# Patient Record
Sex: MALE | Race: WHITE | NOT HISPANIC OR LATINO | Employment: UNEMPLOYED | URBAN - NONMETROPOLITAN AREA
[De-identification: names, ages, dates, MRNs, and addresses within clinical notes are randomized per-mention and may not be internally consistent; named-entity substitution may affect disease eponyms.]

---

## 2022-05-07 ENCOUNTER — HOSPITAL ENCOUNTER (EMERGENCY)
Facility: HOSPITAL | Age: 37
Discharge: LEFT BEFORE DISCHARGE | End: 2022-05-08
Admitting: EMERGENCY MEDICINE

## 2022-05-07 VITALS
OXYGEN SATURATION: 99 % | HEIGHT: 75 IN | HEART RATE: 111 BPM | RESPIRATION RATE: 20 BRPM | WEIGHT: 250 LBS | SYSTOLIC BLOOD PRESSURE: 136 MMHG | DIASTOLIC BLOOD PRESSURE: 101 MMHG | BODY MASS INDEX: 31.08 KG/M2 | TEMPERATURE: 98.2 F

## 2022-05-07 PROCEDURE — 99211 OFF/OP EST MAY X REQ PHY/QHP: CPT

## 2022-05-07 PROCEDURE — 99202 OFFICE O/P NEW SF 15 MIN: CPT

## 2022-05-08 LAB
ALBUMIN SERPL-MCNC: 2.99 G/DL (ref 3.5–5.2)
ALBUMIN/GLOB SERPL: 0.7 G/DL
ALP SERPL-CCNC: 87 U/L (ref 39–117)
ALT SERPL W P-5'-P-CCNC: 46 U/L (ref 1–41)
ANION GAP SERPL CALCULATED.3IONS-SCNC: 12.3 MMOL/L (ref 5–15)
AST SERPL-CCNC: 92 U/L (ref 1–40)
BILIRUB SERPL-MCNC: 5.2 MG/DL (ref 0–1.2)
BUN SERPL-MCNC: 5 MG/DL (ref 6–20)
BUN/CREAT SERPL: 9.1 (ref 7–25)
CALCIUM SPEC-SCNC: 8.3 MG/DL (ref 8.6–10.5)
CHLORIDE SERPL-SCNC: 103 MMOL/L (ref 98–107)
CO2 SERPL-SCNC: 23.7 MMOL/L (ref 22–29)
CREAT SERPL-MCNC: 0.55 MG/DL (ref 0.76–1.27)
EGFRCR SERPLBLD CKD-EPI 2021: 132.5 ML/MIN/1.73
ETHANOL BLD-MCNC: 24 MG/DL (ref 0–10)
ETHANOL UR QL: 0.02 %
GLOBULIN UR ELPH-MCNC: 4.2 GM/DL
GLUCOSE SERPL-MCNC: 136 MG/DL (ref 65–99)
HOLD SPECIMEN: NORMAL
HOLD SPECIMEN: NORMAL
INR PPP: 1.78 (ref 0.9–1.1)
LIPASE SERPL-CCNC: 54 U/L (ref 13–60)
MAGNESIUM SERPL-MCNC: 1.1 MG/DL (ref 1.6–2.6)
POTASSIUM SERPL-SCNC: 3.6 MMOL/L (ref 3.5–5.2)
PROT SERPL-MCNC: 7.2 G/DL (ref 6–8.5)
PROTHROMBIN TIME: 21.2 SECONDS (ref 12.1–14.7)
QT INTERVAL: 394 MS
QTC INTERVAL: 510 MS
SODIUM SERPL-SCNC: 139 MMOL/L (ref 136–145)
TROPONIN T SERPL-MCNC: <0.01 NG/ML (ref 0–0.03)
WHOLE BLOOD HOLD SPECIMEN: NORMAL
WHOLE BLOOD HOLD SPECIMEN: NORMAL

## 2022-05-08 PROCEDURE — 80053 COMPREHEN METABOLIC PANEL: CPT | Performed by: FAMILY MEDICINE

## 2022-05-08 PROCEDURE — 82077 ASSAY SPEC XCP UR&BREATH IA: CPT | Performed by: FAMILY MEDICINE

## 2022-05-08 PROCEDURE — 83735 ASSAY OF MAGNESIUM: CPT | Performed by: FAMILY MEDICINE

## 2022-05-08 PROCEDURE — 85610 PROTHROMBIN TIME: CPT | Performed by: FAMILY MEDICINE

## 2022-05-08 PROCEDURE — 93005 ELECTROCARDIOGRAM TRACING: CPT | Performed by: FAMILY MEDICINE

## 2022-05-08 PROCEDURE — 84484 ASSAY OF TROPONIN QUANT: CPT | Performed by: FAMILY MEDICINE

## 2022-05-08 PROCEDURE — 83690 ASSAY OF LIPASE: CPT | Performed by: FAMILY MEDICINE

## 2022-05-08 PROCEDURE — 93010 ELECTROCARDIOGRAM REPORT: CPT | Performed by: INTERNAL MEDICINE

## 2022-05-08 RX ORDER — SODIUM CHLORIDE 0.9 % (FLUSH) 0.9 %
10 SYRINGE (ML) INJECTION AS NEEDED
Status: DISCONTINUED | OUTPATIENT
Start: 2022-05-08 | End: 2022-05-08 | Stop reason: HOSPADM

## 2022-05-08 NOTE — ED NOTES
Called for reassessment with no answer. Pt not visualized in ED lobby or surrounding areas. Charge RN made aware.

## 2022-05-12 ENCOUNTER — HOSPITAL ENCOUNTER (INPATIENT)
Facility: HOSPITAL | Age: 37
LOS: 1 days | Discharge: HOME | End: 2022-05-13
Attending: PSYCHIATRY & NEUROLOGY | Admitting: PSYCHIATRY & NEUROLOGY

## 2022-05-12 ENCOUNTER — APPOINTMENT (OUTPATIENT)
Dept: ULTRASOUND IMAGING | Facility: HOSPITAL | Age: 37
End: 2022-05-12

## 2022-05-12 ENCOUNTER — APPOINTMENT (OUTPATIENT)
Dept: GENERAL RADIOLOGY | Facility: HOSPITAL | Age: 37
End: 2022-05-12

## 2022-05-12 ENCOUNTER — HOSPITAL ENCOUNTER (EMERGENCY)
Facility: HOSPITAL | Age: 37
Discharge: PSYCHIATRIC OR SUBSTANCE USE FACILITY | End: 2022-05-12
Attending: EMERGENCY MEDICINE | Admitting: EMERGENCY MEDICINE

## 2022-05-12 VITALS
SYSTOLIC BLOOD PRESSURE: 125 MMHG | TEMPERATURE: 97.8 F | HEIGHT: 76 IN | DIASTOLIC BLOOD PRESSURE: 72 MMHG | RESPIRATION RATE: 18 BRPM | BODY MASS INDEX: 27.13 KG/M2 | HEART RATE: 62 BPM | WEIGHT: 222.8 LBS | OXYGEN SATURATION: 99 %

## 2022-05-12 DIAGNOSIS — K74.60 CHRONIC LIVER DISEASE AND CIRRHOSIS: ICD-10-CM

## 2022-05-12 DIAGNOSIS — R45.851 SUICIDAL IDEATIONS: Primary | ICD-10-CM

## 2022-05-12 DIAGNOSIS — F29 PSYCHOSIS, UNSPECIFIED PSYCHOSIS TYPE: ICD-10-CM

## 2022-05-12 DIAGNOSIS — K76.9 CHRONIC LIVER DISEASE AND CIRRHOSIS: ICD-10-CM

## 2022-05-12 PROBLEM — F32.9 MDD (MAJOR DEPRESSIVE DISORDER): Status: ACTIVE | Noted: 2022-05-12

## 2022-05-12 LAB
ACANTHOCYTES BLD QL SMEAR: NORMAL
ALBUMIN SERPL-MCNC: 3.65 G/DL (ref 3.5–5.2)
ALBUMIN/GLOB SERPL: 0.8 G/DL
ALP SERPL-CCNC: 116 U/L (ref 39–117)
ALT SERPL W P-5'-P-CCNC: 52 U/L (ref 1–41)
AMMONIA BLD-SCNC: 32 UMOL/L (ref 16–60)
AMPHET+METHAMPHET UR QL: NEGATIVE
AMPHETAMINES UR QL: NEGATIVE
ANION GAP SERPL CALCULATED.3IONS-SCNC: 14.4 MMOL/L (ref 5–15)
APAP SERPL-MCNC: <5 MCG/ML (ref 0–30)
APTT PPP: 31.9 SECONDS (ref 26.5–34.5)
AST SERPL-CCNC: 106 U/L (ref 1–40)
BACTERIA UR QL AUTO: ABNORMAL /HPF
BARBITURATES UR QL SCN: NEGATIVE
BASOPHILS # BLD AUTO: 0.01 10*3/MM3 (ref 0–0.2)
BASOPHILS NFR BLD AUTO: 0.6 % (ref 0–1.5)
BENZODIAZ UR QL SCN: POSITIVE
BILIRUB CONJ SERPL-MCNC: 2.3 MG/DL (ref 0–0.3)
BILIRUB SERPL-MCNC: 9.5 MG/DL (ref 0–1.2)
BILIRUB UR QL STRIP: ABNORMAL
BUN SERPL-MCNC: 6 MG/DL (ref 6–20)
BUN/CREAT SERPL: 8.1 (ref 7–25)
BUPRENORPHINE SERPL-MCNC: NEGATIVE NG/ML
BURR CELLS BLD QL SMEAR: NORMAL
CALCIUM SPEC-SCNC: 9 MG/DL (ref 8.6–10.5)
CANNABINOIDS SERPL QL: NEGATIVE
CHLORIDE SERPL-SCNC: 103 MMOL/L (ref 98–107)
CLARITY UR: CLEAR
CO2 SERPL-SCNC: 22.6 MMOL/L (ref 22–29)
COCAINE UR QL: NEGATIVE
COLOR UR: ABNORMAL
CREAT SERPL-MCNC: 0.74 MG/DL (ref 0.76–1.27)
CRP SERPL-MCNC: 0.65 MG/DL (ref 0–0.5)
D-LACTATE SERPL-SCNC: 1.3 MMOL/L (ref 0.5–2)
D-LACTATE SERPL-SCNC: 2.1 MMOL/L (ref 0.5–2)
DEPRECATED RDW RBC AUTO: 63.1 FL (ref 37–54)
EGFRCR SERPLBLD CKD-EPI 2021: 121.2 ML/MIN/1.73
EOSINOPHIL # BLD AUTO: 0.07 10*3/MM3 (ref 0–0.4)
EOSINOPHIL NFR BLD AUTO: 4.1 % (ref 0.3–6.2)
ERYTHROCYTE [DISTWIDTH] IN BLOOD BY AUTOMATED COUNT: 17.3 % (ref 12.3–15.4)
ETHANOL BLD-MCNC: <10 MG/DL (ref 0–10)
ETHANOL UR QL: <0.01 %
FLUAV RNA RESP QL NAA+PROBE: NOT DETECTED
FLUBV RNA RESP QL NAA+PROBE: NOT DETECTED
GLOBULIN UR ELPH-MCNC: 4.9 GM/DL
GLUCOSE SERPL-MCNC: 101 MG/DL (ref 65–99)
GLUCOSE UR STRIP-MCNC: NEGATIVE MG/DL
HAV IGM SERPL QL IA: NORMAL
HAV IGM SERPL QL IA: NORMAL
HBV CORE IGM SERPL QL IA: NORMAL
HBV CORE IGM SERPL QL IA: NORMAL
HBV SURFACE AG SERPL QL IA: NORMAL
HBV SURFACE AG SERPL QL IA: NORMAL
HCT VFR BLD AUTO: 36.6 % (ref 37.5–51)
HCV AB SER DONR QL: NORMAL
HCV AB SER DONR QL: NORMAL
HGB BLD-MCNC: 12 G/DL (ref 13–17.7)
HGB UR QL STRIP.AUTO: NEGATIVE
HIV1+2 AB SER QL: NORMAL
HYALINE CASTS UR QL AUTO: ABNORMAL /LPF
HYPOCHROMIA BLD QL: NORMAL
IMM GRANULOCYTES # BLD AUTO: 0.03 10*3/MM3 (ref 0–0.05)
IMM GRANULOCYTES NFR BLD AUTO: 1.7 % (ref 0–0.5)
INR PPP: 1.77 (ref 0.9–1.1)
KETONES UR QL STRIP: NEGATIVE
LARGE PLATELETS: NORMAL
LEUKOCYTE ESTERASE UR QL STRIP.AUTO: ABNORMAL
LYMPHOCYTES # BLD AUTO: 0.27 10*3/MM3 (ref 0.7–3.1)
LYMPHOCYTES NFR BLD AUTO: 15.7 % (ref 19.6–45.3)
MAGNESIUM SERPL-MCNC: 1.2 MG/DL (ref 1.6–2.6)
MCH RBC QN AUTO: 32.3 PG (ref 26.6–33)
MCHC RBC AUTO-ENTMCNC: 32.8 G/DL (ref 31.5–35.7)
MCV RBC AUTO: 98.7 FL (ref 79–97)
METHADONE UR QL SCN: NEGATIVE
MONOCYTES # BLD AUTO: 0.13 10*3/MM3 (ref 0.1–0.9)
MONOCYTES NFR BLD AUTO: 7.6 % (ref 5–12)
NEUTROPHILS NFR BLD AUTO: 1.21 10*3/MM3 (ref 1.7–7)
NEUTROPHILS NFR BLD AUTO: 70.3 % (ref 42.7–76)
NITRITE UR QL STRIP: POSITIVE
NRBC BLD AUTO-RTO: 0 /100 WBC (ref 0–0.2)
OPIATES UR QL: NEGATIVE
OXYCODONE UR QL SCN: NEGATIVE
PCP UR QL SCN: NEGATIVE
PH UR STRIP.AUTO: 6.5 [PH] (ref 5–8)
PLATELET # BLD AUTO: 46 10*3/MM3 (ref 140–450)
PMV BLD AUTO: 10.6 FL (ref 6–12)
POIKILOCYTOSIS BLD QL SMEAR: NORMAL
POTASSIUM SERPL-SCNC: 3.1 MMOL/L (ref 3.5–5.2)
PROPOXYPH UR QL: NEGATIVE
PROT SERPL-MCNC: 8.5 G/DL (ref 6–8.5)
PROT UR QL STRIP: NEGATIVE
PROTHROMBIN TIME: 21.1 SECONDS (ref 12.1–14.7)
QT INTERVAL: 512 MS
QTC INTERVAL: 476 MS
RBC # BLD AUTO: 3.71 10*6/MM3 (ref 4.14–5.8)
RBC # UR STRIP: ABNORMAL /HPF
REF LAB TEST METHOD: ABNORMAL
SALICYLATES SERPL-MCNC: <0.3 MG/DL
SARS-COV-2 RNA RESP QL NAA+PROBE: NOT DETECTED
SCHISTOCYTES BLD QL SMEAR: NORMAL
SODIUM SERPL-SCNC: 140 MMOL/L (ref 136–145)
SP GR UR STRIP: 1.01 (ref 1–1.03)
SQUAMOUS #/AREA URNS HPF: ABNORMAL /HPF
TRICYCLICS UR QL SCN: NEGATIVE
UROBILINOGEN UR QL STRIP: ABNORMAL
WBC # UR STRIP: ABNORMAL /HPF
WBC NRBC COR # BLD: 1.72 10*3/MM3 (ref 3.4–10.8)

## 2022-05-12 PROCEDURE — 93005 ELECTROCARDIOGRAM TRACING: CPT | Performed by: PSYCHIATRY & NEUROLOGY

## 2022-05-12 PROCEDURE — 96366 THER/PROPH/DIAG IV INF ADDON: CPT

## 2022-05-12 PROCEDURE — 85730 THROMBOPLASTIN TIME PARTIAL: CPT | Performed by: PHYSICIAN ASSISTANT

## 2022-05-12 PROCEDURE — 96365 THER/PROPH/DIAG IV INF INIT: CPT

## 2022-05-12 PROCEDURE — 80179 DRUG ASSAY SALICYLATE: CPT | Performed by: PHYSICIAN ASSISTANT

## 2022-05-12 PROCEDURE — 82140 ASSAY OF AMMONIA: CPT | Performed by: PHYSICIAN ASSISTANT

## 2022-05-12 PROCEDURE — 83605 ASSAY OF LACTIC ACID: CPT | Performed by: PHYSICIAN ASSISTANT

## 2022-05-12 PROCEDURE — 25010000002 THIAMINE PER 100 MG: Performed by: PHYSICIAN ASSISTANT

## 2022-05-12 PROCEDURE — 96367 TX/PROPH/DG ADDL SEQ IV INF: CPT

## 2022-05-12 PROCEDURE — 99221 1ST HOSP IP/OBS SF/LOW 40: CPT | Performed by: INTERNAL MEDICINE

## 2022-05-12 PROCEDURE — G0432 EIA HIV-1/HIV-2 SCREEN: HCPCS | Performed by: PHYSICIAN ASSISTANT

## 2022-05-12 PROCEDURE — 80306 DRUG TEST PRSMV INSTRMNT: CPT | Performed by: PHYSICIAN ASSISTANT

## 2022-05-12 PROCEDURE — 83735 ASSAY OF MAGNESIUM: CPT | Performed by: PHYSICIAN ASSISTANT

## 2022-05-12 PROCEDURE — 76705 ECHO EXAM OF ABDOMEN: CPT

## 2022-05-12 PROCEDURE — 36415 COLL VENOUS BLD VENIPUNCTURE: CPT

## 2022-05-12 PROCEDURE — 85610 PROTHROMBIN TIME: CPT | Performed by: PHYSICIAN ASSISTANT

## 2022-05-12 PROCEDURE — 80074 ACUTE HEPATITIS PANEL: CPT | Performed by: PHYSICIAN ASSISTANT

## 2022-05-12 PROCEDURE — 25010000002 ZIPRASIDONE MESYLATE PER 10 MG: Performed by: EMERGENCY MEDICINE

## 2022-05-12 PROCEDURE — 85025 COMPLETE CBC W/AUTO DIFF WBC: CPT | Performed by: PHYSICIAN ASSISTANT

## 2022-05-12 PROCEDURE — 87636 SARSCOV2 & INF A&B AMP PRB: CPT | Performed by: PHYSICIAN ASSISTANT

## 2022-05-12 PROCEDURE — 99285 EMERGENCY DEPT VISIT HI MDM: CPT

## 2022-05-12 PROCEDURE — 81001 URINALYSIS AUTO W/SCOPE: CPT | Performed by: PHYSICIAN ASSISTANT

## 2022-05-12 PROCEDURE — 80074 ACUTE HEPATITIS PANEL: CPT

## 2022-05-12 PROCEDURE — 25010000002 MAGNESIUM SULFATE 2 GM/50ML SOLUTION: Performed by: PHYSICIAN ASSISTANT

## 2022-05-12 PROCEDURE — 80053 COMPREHEN METABOLIC PANEL: CPT | Performed by: PHYSICIAN ASSISTANT

## 2022-05-12 PROCEDURE — 74018 RADEX ABDOMEN 1 VIEW: CPT

## 2022-05-12 PROCEDURE — 96372 THER/PROPH/DIAG INJ SC/IM: CPT

## 2022-05-12 PROCEDURE — 87040 BLOOD CULTURE FOR BACTERIA: CPT | Performed by: PHYSICIAN ASSISTANT

## 2022-05-12 PROCEDURE — 76705 ECHO EXAM OF ABDOMEN: CPT | Performed by: RADIOLOGY

## 2022-05-12 PROCEDURE — 82077 ASSAY SPEC XCP UR&BREATH IA: CPT | Performed by: PHYSICIAN ASSISTANT

## 2022-05-12 PROCEDURE — 82248 BILIRUBIN DIRECT: CPT | Performed by: INTERNAL MEDICINE

## 2022-05-12 PROCEDURE — 80143 DRUG ASSAY ACETAMINOPHEN: CPT | Performed by: PHYSICIAN ASSISTANT

## 2022-05-12 PROCEDURE — 86140 C-REACTIVE PROTEIN: CPT | Performed by: PHYSICIAN ASSISTANT

## 2022-05-12 PROCEDURE — C9803 HOPD COVID-19 SPEC COLLECT: HCPCS | Performed by: PHYSICIAN ASSISTANT

## 2022-05-12 PROCEDURE — 85007 BL SMEAR W/DIFF WBC COUNT: CPT | Performed by: PHYSICIAN ASSISTANT

## 2022-05-12 PROCEDURE — 25010000002 LORAZEPAM PER 2 MG: Performed by: EMERGENCY MEDICINE

## 2022-05-12 RX ORDER — FOLIC ACID 1 MG/1
1 TABLET ORAL DAILY
Status: DISCONTINUED | OUTPATIENT
Start: 2022-05-12 | End: 2022-05-13 | Stop reason: HOSPADM

## 2022-05-12 RX ORDER — MULTIPLE VITAMINS W/ MINERALS TAB 9MG-400MCG
1 TAB ORAL DAILY
Status: DISCONTINUED | OUTPATIENT
Start: 2022-05-12 | End: 2022-05-13 | Stop reason: HOSPADM

## 2022-05-12 RX ORDER — HYDROXYZINE 50 MG/1
50 TABLET, FILM COATED ORAL EVERY 6 HOURS PRN
Status: DISCONTINUED | OUTPATIENT
Start: 2022-05-12 | End: 2022-05-13 | Stop reason: HOSPADM

## 2022-05-12 RX ORDER — LORAZEPAM 1 MG/1
1 TABLET ORAL EVERY 4 HOURS PRN
Status: DISCONTINUED | OUTPATIENT
Start: 2022-05-15 | End: 2022-05-13 | Stop reason: HOSPADM

## 2022-05-12 RX ORDER — TRAZODONE HYDROCHLORIDE 50 MG/1
50 TABLET ORAL NIGHTLY PRN
Status: DISCONTINUED | OUTPATIENT
Start: 2022-05-12 | End: 2022-05-13 | Stop reason: HOSPADM

## 2022-05-12 RX ORDER — MAGNESIUM SULFATE HEPTAHYDRATE 40 MG/ML
2 INJECTION, SOLUTION INTRAVENOUS ONCE
Status: COMPLETED | OUTPATIENT
Start: 2022-05-12 | End: 2022-05-12

## 2022-05-12 RX ORDER — BENZTROPINE MESYLATE 1 MG/1
2 TABLET ORAL ONCE AS NEEDED
Status: DISCONTINUED | OUTPATIENT
Start: 2022-05-12 | End: 2022-05-13 | Stop reason: HOSPADM

## 2022-05-12 RX ORDER — IBUPROFEN 400 MG/1
400 TABLET ORAL EVERY 6 HOURS PRN
Status: DISCONTINUED | OUTPATIENT
Start: 2022-05-12 | End: 2022-05-13 | Stop reason: HOSPADM

## 2022-05-12 RX ORDER — LORAZEPAM 1 MG/1
1 TABLET ORAL
Status: DISCONTINUED | OUTPATIENT
Start: 2022-05-15 | End: 2022-05-13 | Stop reason: HOSPADM

## 2022-05-12 RX ORDER — FAMOTIDINE 20 MG/1
20 TABLET, FILM COATED ORAL 2 TIMES DAILY PRN
Status: DISCONTINUED | OUTPATIENT
Start: 2022-05-12 | End: 2022-05-13 | Stop reason: HOSPADM

## 2022-05-12 RX ORDER — ECHINACEA PURPUREA EXTRACT 125 MG
2 TABLET ORAL AS NEEDED
Status: DISCONTINUED | OUTPATIENT
Start: 2022-05-12 | End: 2022-05-13 | Stop reason: HOSPADM

## 2022-05-12 RX ORDER — POTASSIUM CHLORIDE 20 MEQ/1
40 TABLET, EXTENDED RELEASE ORAL ONCE
Status: COMPLETED | OUTPATIENT
Start: 2022-05-12 | End: 2022-05-12

## 2022-05-12 RX ORDER — CLONIDINE HYDROCHLORIDE 0.1 MG/1
0.1 TABLET ORAL DAILY PRN
Status: DISCONTINUED | OUTPATIENT
Start: 2022-05-16 | End: 2022-05-13 | Stop reason: HOSPADM

## 2022-05-12 RX ORDER — SODIUM CHLORIDE 0.9 % (FLUSH) 0.9 %
10 SYRINGE (ML) INJECTION AS NEEDED
Status: DISCONTINUED | OUTPATIENT
Start: 2022-05-12 | End: 2022-05-12 | Stop reason: HOSPADM

## 2022-05-12 RX ORDER — METHION/INOS/CHOL BT/B COM/LIV 110MG-86MG
500 CAPSULE ORAL 3 TIMES DAILY
Status: DISCONTINUED | OUTPATIENT
Start: 2022-05-12 | End: 2022-05-13 | Stop reason: HOSPADM

## 2022-05-12 RX ORDER — ACETAMINOPHEN 325 MG/1
650 TABLET ORAL EVERY 6 HOURS PRN
Status: DISCONTINUED | OUTPATIENT
Start: 2022-05-12 | End: 2022-05-13 | Stop reason: HOSPADM

## 2022-05-12 RX ORDER — LORAZEPAM 2 MG/ML
1 INJECTION INTRAMUSCULAR ONCE
Status: COMPLETED | OUTPATIENT
Start: 2022-05-12 | End: 2022-05-12

## 2022-05-12 RX ORDER — BENZTROPINE MESYLATE 1 MG/ML
1 INJECTION INTRAMUSCULAR; INTRAVENOUS ONCE AS NEEDED
Status: DISCONTINUED | OUTPATIENT
Start: 2022-05-12 | End: 2022-05-13 | Stop reason: HOSPADM

## 2022-05-12 RX ORDER — MAGNESIUM SULFATE HEPTAHYDRATE 40 MG/ML
2 INJECTION, SOLUTION INTRAVENOUS ONCE
Status: DISCONTINUED | OUTPATIENT
Start: 2022-05-12 | End: 2022-05-12

## 2022-05-12 RX ORDER — DICYCLOMINE HYDROCHLORIDE 10 MG/1
10 CAPSULE ORAL 3 TIMES DAILY PRN
Status: DISCONTINUED | OUTPATIENT
Start: 2022-05-12 | End: 2022-05-13 | Stop reason: HOSPADM

## 2022-05-12 RX ORDER — LORAZEPAM 2 MG/1
2 TABLET ORAL
Status: DISPENSED | OUTPATIENT
Start: 2022-05-12 | End: 2022-05-13

## 2022-05-12 RX ORDER — ONDANSETRON 4 MG/1
4 TABLET, FILM COATED ORAL EVERY 6 HOURS PRN
Status: DISCONTINUED | OUTPATIENT
Start: 2022-05-12 | End: 2022-05-13 | Stop reason: HOSPADM

## 2022-05-12 RX ORDER — LORAZEPAM 0.5 MG/1
0.5 TABLET ORAL
Status: DISCONTINUED | OUTPATIENT
Start: 2022-05-16 | End: 2022-05-13 | Stop reason: HOSPADM

## 2022-05-12 RX ORDER — CLONIDINE HYDROCHLORIDE 0.1 MG/1
0.1 TABLET ORAL 4 TIMES DAILY PRN
Status: ACTIVE | OUTPATIENT
Start: 2022-05-12 | End: 2022-05-13

## 2022-05-12 RX ORDER — ALUMINA, MAGNESIA, AND SIMETHICONE 2400; 2400; 240 MG/30ML; MG/30ML; MG/30ML
15 SUSPENSION ORAL EVERY 6 HOURS PRN
Status: DISCONTINUED | OUTPATIENT
Start: 2022-05-12 | End: 2022-05-13 | Stop reason: HOSPADM

## 2022-05-12 RX ORDER — CLONIDINE HYDROCHLORIDE 0.1 MG/1
0.1 TABLET ORAL 2 TIMES DAILY PRN
Status: DISCONTINUED | OUTPATIENT
Start: 2022-05-15 | End: 2022-05-13 | Stop reason: HOSPADM

## 2022-05-12 RX ORDER — CLONIDINE HYDROCHLORIDE 0.1 MG/1
0.1 TABLET ORAL 3 TIMES DAILY PRN
Status: DISCONTINUED | OUTPATIENT
Start: 2022-05-14 | End: 2022-05-13 | Stop reason: HOSPADM

## 2022-05-12 RX ORDER — LOPERAMIDE HYDROCHLORIDE 2 MG/1
2 CAPSULE ORAL
Status: DISCONTINUED | OUTPATIENT
Start: 2022-05-12 | End: 2022-05-13 | Stop reason: HOSPADM

## 2022-05-12 RX ORDER — CYCLOBENZAPRINE HCL 10 MG
10 TABLET ORAL 3 TIMES DAILY PRN
Status: DISCONTINUED | OUTPATIENT
Start: 2022-05-12 | End: 2022-05-13 | Stop reason: HOSPADM

## 2022-05-12 RX ORDER — LORAZEPAM 2 MG/1
2 TABLET ORAL EVERY 4 HOURS PRN
Status: DISCONTINUED | OUTPATIENT
Start: 2022-05-13 | End: 2022-05-13 | Stop reason: HOSPADM

## 2022-05-12 RX ORDER — METHION/INOS/CHOL BT/B COM/LIV 110MG-86MG
500 CAPSULE ORAL DAILY
Status: DISCONTINUED | OUTPATIENT
Start: 2022-05-13 | End: 2022-05-12

## 2022-05-12 RX ORDER — BENZONATATE 100 MG/1
100 CAPSULE ORAL 3 TIMES DAILY PRN
Status: DISCONTINUED | OUTPATIENT
Start: 2022-05-12 | End: 2022-05-13 | Stop reason: HOSPADM

## 2022-05-12 RX ORDER — MULTIVITAMIN WITH IRON
2 TABLET ORAL DAILY
Status: DISCONTINUED | OUTPATIENT
Start: 2022-05-12 | End: 2022-05-13 | Stop reason: HOSPADM

## 2022-05-12 RX ORDER — LORAZEPAM 0.5 MG/1
0.5 TABLET ORAL EVERY 4 HOURS PRN
Status: DISCONTINUED | OUTPATIENT
Start: 2022-05-16 | End: 2022-05-13 | Stop reason: HOSPADM

## 2022-05-12 RX ORDER — CLONIDINE HYDROCHLORIDE 0.1 MG/1
0.1 TABLET ORAL 4 TIMES DAILY PRN
Status: DISCONTINUED | OUTPATIENT
Start: 2022-05-13 | End: 2022-05-13 | Stop reason: HOSPADM

## 2022-05-12 RX ORDER — NICOTINE 21 MG/24HR
1 PATCH, TRANSDERMAL 24 HOURS TRANSDERMAL
Status: DISCONTINUED | OUTPATIENT
Start: 2022-05-12 | End: 2022-05-13 | Stop reason: HOSPADM

## 2022-05-12 RX ORDER — LORAZEPAM 2 MG/1
2 TABLET ORAL
Status: DISCONTINUED | OUTPATIENT
Start: 2022-05-13 | End: 2022-05-13 | Stop reason: HOSPADM

## 2022-05-12 RX ORDER — PANTOPRAZOLE SODIUM 40 MG/1
40 TABLET, DELAYED RELEASE ORAL
Status: DISCONTINUED | OUTPATIENT
Start: 2022-05-13 | End: 2022-05-13 | Stop reason: HOSPADM

## 2022-05-12 RX ADMIN — LORAZEPAM 2 MG: 2 TABLET ORAL at 13:36

## 2022-05-12 RX ADMIN — HYDROXYZINE HYDROCHLORIDE 50 MG: 50 TABLET ORAL at 13:35

## 2022-05-12 RX ADMIN — TRAZODONE HYDROCHLORIDE 50 MG: 50 TABLET ORAL at 20:20

## 2022-05-12 RX ADMIN — Medication 2 TABLET: at 15:21

## 2022-05-12 RX ADMIN — Medication 400 MG: at 15:22

## 2022-05-12 RX ADMIN — POTASSIUM CHLORIDE 40 MEQ: 20 TABLET, EXTENDED RELEASE ORAL at 20:20

## 2022-05-12 RX ADMIN — POTASSIUM CHLORIDE 40 MEQ: 20 TABLET, EXTENDED RELEASE ORAL at 09:03

## 2022-05-12 RX ADMIN — Medication 1 TABLET: at 15:22

## 2022-05-12 RX ADMIN — Medication 100 MG: at 13:36

## 2022-05-12 RX ADMIN — MAGNESIUM GLUCONATE 500 MG ORAL TABLET 800 MG: 500 TABLET ORAL at 20:20

## 2022-05-12 RX ADMIN — FOLIC ACID 1 MG: 1 TABLET ORAL at 15:22

## 2022-05-12 RX ADMIN — ZIPRASIDONE MESYLATE 10 MG: 20 INJECTION, POWDER, LYOPHILIZED, FOR SOLUTION INTRAMUSCULAR at 06:15

## 2022-05-12 RX ADMIN — IBUPROFEN 400 MG: 400 TABLET, FILM COATED ORAL at 20:20

## 2022-05-12 RX ADMIN — THIAMINE HYDROCHLORIDE 1000 ML/HR: 100 INJECTION, SOLUTION INTRAMUSCULAR; INTRAVENOUS at 07:33

## 2022-05-12 RX ADMIN — LORAZEPAM 1 MG: 2 INJECTION, SOLUTION INTRAMUSCULAR; INTRAVENOUS at 06:27

## 2022-05-12 RX ADMIN — MAGNESIUM SULFATE HEPTAHYDRATE 2 G: 40 INJECTION, SOLUTION INTRAVENOUS at 09:03

## 2022-05-12 RX ADMIN — POTASSIUM & SODIUM PHOSPHATES POWDER PACK 280-160-250 MG 2 PACKET: 280-160-250 PACK at 20:20

## 2022-05-12 RX ADMIN — Medication 500 MG: at 20:21

## 2022-05-12 NOTE — NURSING NOTE
Per ER provider, patient is to be taken to ED12. Patient remains unstable and unable to get blood in intake due to patient being uncooperative. Patient stating you are trying to kill me.     Patient taken via WC to ED12 by Drea and ER Tech and security at this time.

## 2022-05-12 NOTE — NURSING NOTE
Pt removed paper scrub bottoms at this time. Pt refusing to put pants back on. Lawtey provided for privacy.

## 2022-05-12 NOTE — ED PROVIDER NOTES
Subjective   36-year-old male presents to the ER with complaints of depression and suicidal ideations.  Patient verbalized that he is from [place] and he is homeless.  Patient does have past suicidal attempt.  Patient verbalizes that he drinks daily.  During questioning patient just stopped talking.  Appears to have some psychosis as well.  Patient is a  .          Review of Systems   Constitutional: Negative.  Negative for fever.   HENT: Negative.    Respiratory: Negative.    Cardiovascular: Negative.  Negative for chest pain.   Gastrointestinal: Negative.  Negative for abdominal pain.   Endocrine: Negative.    Genitourinary: Negative.  Negative for dysuria.   Skin: Positive for color change.   Neurological: Negative.    Psychiatric/Behavioral: Positive for behavioral problems. The patient is nervous/anxious.    All other systems reviewed and are negative.      Past Medical History:   Diagnosis Date   • Alcohol abuse    • Cirrhosis (HCC)    • HTN (hypertension)    • Liver disease    • Psychosis (HCC)    • Substance abuse (HCC)    • Withdrawal symptoms, alcohol (HCC)    • Withdrawal symptoms, drug or narcotic (HCC)        Allergies   Allergen Reactions   • Penicillins Unknown - Low Severity       No past surgical history on file.    No family history on file.    Social History     Socioeconomic History   • Marital status: Single   Tobacco Use   • Smoking status: Current Every Day Smoker     Packs/day: 1.00     Types: Cigarettes   • Smokeless tobacco: Never Used   Vaping Use   • Vaping Use: Never used   Substance and Sexual Activity   • Alcohol use: Yes     Alcohol/week: 30.0 standard drinks     Types: 30 Drinks containing 0.5 oz of alcohol per week     Comment: 2 to 3 pints a day   • Sexual activity: Not Currently     Partners: Female     Birth control/protection: None           Objective   Physical Exam  Vitals and nursing note reviewed.   Constitutional:       General: He is not in acute  distress.     Appearance: He is well-developed. He is not diaphoretic.   HENT:      Head: Normocephalic and atraumatic.      Right Ear: External ear normal.      Left Ear: External ear normal.      Nose: Nose normal.   Eyes:      General: Scleral icterus present.      Conjunctiva/sclera: Conjunctivae normal.   Neck:      Vascular: No JVD.      Trachea: No tracheal deviation.   Cardiovascular:      Rate and Rhythm: Normal rate.      Heart sounds: No murmur heard.  Pulmonary:      Effort: Pulmonary effort is normal. No respiratory distress.      Breath sounds: No wheezing.   Abdominal:      Palpations: Abdomen is soft.      Tenderness: There is no abdominal tenderness.   Musculoskeletal:         General: No deformity. Normal range of motion.      Cervical back: Normal range of motion and neck supple.   Skin:     General: Skin is warm and dry.      Coloration: Skin is jaundiced. Skin is not pale.      Findings: No erythema or rash.   Neurological:      Mental Status: He is alert and oriented to person, place, and time.      Cranial Nerves: No cranial nerve deficit.   Psychiatric:      Comments: Odd behavior.  Patient will discuss his medical history but all of a sudden stopped talking.  Patient will begin to look around the room.  Has trouble tracking his thoughts.  Patient did say he consumed alcohol today however cannot state when it was.         Procedures           ED Course  ED Course as of 05/12/22 1236   Thu May 12, 2022   0611 Patient began to undress in the intake common area.  Patient also tried to hug one of the staff members.  Patient was moved into a separate room.  Requested security be contacted. [RB]   0611 Patient was moved to a hospital bed based on his combativeness.  Geodon that was administered previously has become effective and patient has become increasingly cooperative.  Dr. Harper was able to place a midline in his right upper extremity. [RB]   0701 At he beginning debriefing completed.  Secondary to patient's combativeness.  Patient was briefly held at 0615 to attempt blood draw. Hold lasted less than 60 seconds.  This was done to protect himself as well as other staff members.  No apparent injuries occurred.  Mental status at debriefing pt was more cooperative.  [RB]   0834 MELD score 21 [AH]   0911 US Abdomen Limited  FINDINGS:    Liver:  Cirrhotic nodular liver contour.  No intrahepatic bile duct  dilation.    Gallbladder:  Unable to visualize the gallbladder.  No gallstones.    Common bile duct:  The CBD measures 6.54 mm.  No stones.  No dilation.    Pancreas:  Unremarkable as visualized.    Right kidney: Unremarkable.  No stones.  No solid mass.  No  hydronephrosis.    Free fluid:  Tiny ascites.     IMPRESSION:  1.  Tiny ascites.  2.  Cirrhotic nodular liver contour. [AH]   1116 Medically clear for psych [AH]   1156 [facility] to [facility]. [AH]   1222 [facility] is unable to [facility] the patient at this time because they do not have any medical beds if the patient were to become unstable. Intake to discuss with Dr. Hernandez for admission here. [AH]      ED Course User Index  [AH] Ana Rudolph PA  [RB] Jun Charles II, PA                                                 MDM  Number of Diagnoses or Management Options     Amount and/or Complexity of Data Reviewed  Clinical lab tests: reviewed  Tests in the radiology section of CPT®: reviewed    Patient Progress  Patient progress: stable      Final diagnoses:   Suicidal ideations   Psychosis, unspecified psychosis type (HCC)   Chronic liver disease and cirrhosis (HCC)       ED Disposition  ED Disposition     ED Disposition   DC/Transfer to Behavioral Health    Condition   Stable    Comment   --             No follow-up provider specified.       Medication List      No changes were made to your prescriptions during this visit.          Ana Rudolph PA  05/12/22 1236

## 2022-05-12 NOTE — ACP (ADVANCE CARE PLANNING)
Patient continues to say that he is in hell and appears very restless. ED provider present and reports that patient may need a medical bed. Instructed that he will check and let me know. ED provider made aware that patient has not let staff get blood.

## 2022-05-12 NOTE — NURSING NOTE
Brief Hold initiated at this time d/t excessive pt movement while Butterfly needle for Lab draw still in pt's arm. Pt held until needle could be removed.

## 2022-05-12 NOTE — ED NOTES
PT able to tolerate PO meds but very lethargic. Pt states he is unable to void urine at this time.

## 2022-05-12 NOTE — NURSING NOTE
Call[facility] and spoke to the [facility]Per Mallory, their provider Information.  Will follow up when pt has had ua completed.
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
## 2022-05-12 NOTE — NURSING NOTE
"Patient presented to [facility] and reported suicidal thoughts with no plan. During the admission assessment he reports he was brought by the police. He had been staying at the [facility] for 3 days he had come to [place] from [place] to dry out. His girlfriend broke up with him today. He reports suicidal thoughts with no plan, denies hx of suicide attempt in the past. He reports drinking 3/4th of a fifth per day and also drinks beer. Patient had been medicated in the ER d/t bizarre behavior, exposing himself and \"talking out of his head\". He was able to answer some questions with limited information but would begin to mumble and had to be questioned repeatedly. He reports anxiety rated 7/10 and depression and craving 10/10. He was a poor historian regarding drug use, substance abuse, and health history. He reports hx of TBI  "

## 2022-05-12 NOTE — NURSING NOTE
Debriefing occurred in ED12 with Jun Charles. Patient is much calmer now and does not remember what he did. Per provider, the patient will be monitored and a work up completed.

## 2022-05-12 NOTE — NURSING NOTE
Per ER provider, the patient is stable tna cleared for psych. Instructed that he is ready for intake. instructed her that he has [facility] and that he we called the [facility] and spoke with them. Clinical information faxed and waiting for a return call. Will let her know when they call back.

## 2022-05-12 NOTE — NURSING NOTE
Spoke to Dr. Hernandez briefly and discussed intake information. Instructed to check with the patient and since he [facility] and if he is willing send him to the [facility] and if not then call him back. Information verbalized.

## 2022-05-12 NOTE — NURSING NOTE
Per ED provider, the patient will be cleared for psych once he gets his mag and potassium. Instructed that his cirrhosis is chronic. Information verbalized.

## 2022-05-12 NOTE — PLAN OF CARE
Goal Outcome Evaluation:  Plan of Care Reviewed With: patient  Patient Agreement with Plan of Care: agrees     Progress: no change  Outcome Evaluation: New admit today. Patient has been calm and cooperative since arrival to unit. Hospitalist evaluated patient this shift.

## 2022-05-12 NOTE — NURSING NOTE
Dr. Katayoun Behbahani returned call and accepted the consult. Dr. Behbahani was added to the patient treatment team at this time.

## 2022-05-12 NOTE — NURSING NOTE
Spoke with patient in ED12. Sitter at his bedside at this time. Patient is resting with eyes closed but is easily arousable. Patient states that he has been here in [place] for about a week and yesterday went to some rehab place. Unsure of the name at this time. He states that they brought him here this am and just dropped him off. Upon arrival to the intake area the patient was showing some delirium and agitation. Began undressing exposing himself and talking out of his head. He was uncooperative with staff and received prn medication and was put in room 12 up in the ER for medical clearance. While in the ER he became more alert and staff then was able to care for him. He states that [facility]. He also reports a hx of etoh and drug use. And chronic cirrhosis. He states that he went to the rehab to get off drugs and alcohol. He also reports that he is not feeling suicidal now and cant remember what he said before but is just tired of being in pain mentally and physically and just wants it all to stop. He admits to drinking and doing drugs every day, but is a poor historian with exact amounts and use. Patient received prn medication in the ER and at this time is calm and not displaying any withdrawal symptoms. Denies HI or AVH at this time.

## 2022-05-12 NOTE — NURSING NOTE
Spoke to  via phone. Intake information provided all labs and information provided. . Instructed to admit the patient.and do a hospital consult.  Admit orders received. RBVOx2.. Patient and ed provider made aware of plan of care. Safety precautions maintained.

## 2022-05-12 NOTE — NURSING NOTE
Pt undressed completely in Intake entrance, was assisted to ED6 by Security and ED/Intake staff. Scrubs donned once in ED6.

## 2022-05-12 NOTE — NURSING NOTE
Pt agitated and combative at this time asking if he is in Hell, are staff his family, did he die, did he kill himself, did he kill others. Pt named multiple male and female names while looking blankly at staff. Pt unable to recognize RN speaking at this time. Pt unable to redirect. Staff attempted to draw blood, but pt was unable to remain still. ED Provider notified.

## 2022-05-12 NOTE — CONSULTS
[facility]  CONSULT NOTE     Inpatient Hospitalist Consult  Consult performed by: Maddi Kramer APRN  Consult ordered by: Ney Hernandez MD          Patient Identification:  Name:  Wilfredo Lau  Age:  36 y.o.  Sex:  male  :  1985  MRN:  [id number]  Visit Number:  [id number]  Primary care provider:  Provider, No Known    Length of stay:  0    Subjective     Chief Complaint:  No chief complaint on file.      Requesting Provider: Dr. Hernandez    Reason for Consultation: medical management of chronic live cirrhosis       History of presenting illness:     Wilfredo Lau is a 36 y.o. male admitted by Dr. Hernandez to the [facility] for depression and suicidal ideation. The patient is from [place]. He has only been in the area for approximately 1 week. He has a longstanding history of alcohol substance abuse. He states that he drinks 1-2 pints of vodka daily. He last used illicit Fentynal approximately 3 months ago. He is aware that he has cirrhosis of the liver.   After reviewing the medical records, the patient had a presentation at the ED on 2022 with intoxication, however the patient left AMA without being seen by a provider. At that time his ethanol level was elevated to 24 and his magnesium was 1.1    The patient is sleeping in the day room but easily aroused at the time of my evaluation. He agreed to talk to me and have his exam in the dayroom. He is pleasant and appropriate. He is A&O x3. He states his last drink of alcohol was just before his presentation to the emergency department. He denies current IV drug use to known history of hepatitis or other communicable diseases. He endorses lower abdominal pain.  He denies nausea, vomiting, diarrhea. He denies blood in stool or other overt signs of bleeding.  He denies any other chronic medical conditions and states that he takes no medications at home. He makes minimal eye contact during  "conversations and stays covered under a blanket. I was unable to observe any overt jaundice at the time of my examination.     ---------------------------------------------------------------------------------------------------------------------  Review of Systems   Constitutional: Negative for activity change, appetite change, chills, diaphoresis, fatigue and fever.   Respiratory: Negative for chest tightness, shortness of breath and wheezing.    Cardiovascular: Negative for chest pain and leg swelling.   Gastrointestinal: Positive for abdominal pain. Negative for blood in stool, constipation, diarrhea, nausea and vomiting.        Lower abdominal pain, \"sharp\" in nature.    Genitourinary: Negative for difficulty urinating, dysuria and flank pain.   Neurological: Negative for dizziness, tremors, seizures, weakness and headaches.   Hematological: Negative for adenopathy. Does not bruise/bleed easily.   All other systems reviewed and are negative.         ---------------------------------------------------------------------------------------------------------------------   Past History:  Past Medical History:   Diagnosis Date   • Alcohol abuse    • Cirrhosis (HCC)    • HTN (hypertension)    • Liver disease    • Psychosis (HCC)    • Substance abuse (HCC)    • Withdrawal symptoms, alcohol (HCC)    • Withdrawal symptoms, drug or narcotic (HCC)      History reviewed. No pertinent surgical history.  History reviewed. No pertinent family history.  Social History     Socioeconomic History   • Marital status: Single   Tobacco Use   • Smoking status: Current Every Day Smoker     Packs/day: 1.00     Types: Cigarettes   • Smokeless tobacco: Never Used   Vaping Use   • Vaping Use: Never used   Substance and Sexual Activity   • Alcohol use: Yes     Alcohol/week: 30.0 standard drinks     Types: 30 Drinks containing 0.5 oz of alcohol per week     Comment: 2 to 3 pints a day   • Sexual activity: Not Currently     Partners: Female     " Birth control/protection: None     ---------------------------------------------------------------------------------------------------------------------   Allergies:  Penicillins  ---------------------------------------------------------------------------------------------------------------------   Prior to Admission Medications     None        ---------------------------------------------------------------------------------------------------------------------     Objective          Hospital Meds:  B-complex with vitamin C, 2 tablet, Oral, Daily  folic acid, 1 mg, Oral, Daily  [START ON 5/13/2022] LORazepam, 2 mg, Oral, 3 times per day   Followed by  [START ON 5/14/2022] LORazepam, 1.5 mg, Oral, 3 times per day   Followed by  [START ON 5/15/2022] LORazepam, 1 mg, Oral, 3 times per day   Followed by  [START ON 5/16/2022] LORazepam, 0.5 mg, Oral, 3 times per day  multivitamin with minerals, 1 tablet, Oral, Daily  nicotine, 1 patch, Transdermal, Q24H  [START ON 5/13/2022] pantoprazole, 40 mg, Oral, Q AM  [START ON 5/15/2022] vitamin B-1, 100 mg, Oral, Daily  [START ON 5/13/2022] vitamin B-1, 500 mg, Oral, Daily         ---------------------------------------------------------------------------------------------------------------------   Vital Signs:  Temp:  [97.8 °F (36.6 °C)-98.2 °F (36.8 °C)] 98 °F (36.7 °C)  Heart Rate:  [60-89] 60  Resp:  [18] 18  BP: (104-172)/(55-94) 125/68  No data found.  SpO2 Percentage    05/12/22 1315 05/12/22 1400   SpO2: 99% 97%     SpO2:  [97 %-100 %] 97 %  on   ;   Device (Oxygen Therapy): room air    Body mass index is 29.4 kg/m².  Wt Readings from Last 3 Encounters:   05/12/22 98.3 kg (216 lb 12.8 oz)   05/12/22 101 kg (222 lb 12.8 oz)        Intake/Output Summary (Last 24 hours) at 5/12/2022 1603  Last data filed at 5/12/2022 1008  Gross per 24 hour   Intake 1000 ml   Output 150 ml   Net 850 ml     Diet  Regular  ---------------------------------------------------------------------------------------------------------------------   Physical exam:  Physical Exam  Vitals and nursing note reviewed.   Constitutional:       General: He is awake.   HENT:      Head: Normocephalic and atraumatic.   Cardiovascular:      Rate and Rhythm: Normal rate and regular rhythm.      Pulses: Normal pulses.      Heart sounds: Normal heart sounds, S1 normal and S2 normal.   Pulmonary:      Effort: Pulmonary effort is normal.      Breath sounds: Normal breath sounds.   Abdominal:      Tenderness: There is abdominal tenderness in the right lower quadrant and left lower quadrant.   Musculoskeletal:      Cervical back: Normal range of motion and neck supple.   Skin:     General: Skin is warm and dry.      Capillary Refill: Capillary refill takes less than 2 seconds.      Comments: Multiple tattoos and facial piecings   Neurological:      Mental Status: He is alert and oriented to person, place, and time.      Cranial Nerves: Cranial nerves are intact.   Psychiatric:         Attention and Perception: Attention normal.         Mood and Affect: Mood is depressed.         Behavior: Behavior is cooperative.       ---------------------------------------------------------------------------------------------------------------------   Results from last 7 days   Lab Units 05/08/22  0016   TROPONIN T ng/mL <0.010           Results from last 7 days   Lab Units 05/12/22  1048 05/12/22  0748 05/12/22  0650 05/08/22  0016   CRP mg/dL  --   --  0.65*  --    LACTATE mmol/L 1.3 2.1*  --   --    WBC 10*3/mm3  --   --  1.72*  --    HEMOGLOBIN g/dL  --   --  12.0*  --    HEMATOCRIT %  --   --  36.6*  --    MCV fL  --   --  98.7*  --    MCHC g/dL  --   --  32.8  --    PLATELETS 10*3/mm3  --   --  46*  --    INR   --   --  1.77* 1.78*     Results from last 7 days   Lab Units 05/12/22  0650 05/08/22  0016   SODIUM mmol/L 140 139   POTASSIUM mmol/L 3.1* 3.6    MAGNESIUM mg/dL 1.2* 1.1*   CHLORIDE mmol/L 103 103   CO2 mmol/L 22.6 23.7   BUN mg/dL 6 5*   CREATININE mg/dL 0.74* 0.55*   CALCIUM mg/dL 9.0 8.3*   GLUCOSE mg/dL 101* 136*   ALBUMIN g/dL 3.65 2.99*   BILIRUBIN mg/dL 9.5* 5.2*   ALK PHOS U/L 116 87   AST (SGOT) U/L 106* 92*   ALT (SGPT) U/L 52* 46*   Estimated Creatinine Clearance: 169.3 mL/min (A) (by C-G formula based on SCr of 0.74 mg/dL (L)).  Ammonia   Date Value Ref Range Status   05/12/2022 32 16 - 60 umol/L Final       No results found for: HGBA1C, POCGLU  No results found for: HGBA1C  No results found for: TSH, FREET4    No results found for: BLOODCX  No results found for: URINECX  No results found for: WOUNDCX  No results found for: STOOLCX  No results found for: RESPCX  Pain Management Panel     Pain Management Panel Latest Ref Rng & Units 5/12/2022    AMPHETAMINES SCREEN, URINE Negative Negative    BARBITURATES SCREEN Negative Negative    BENZODIAZEPINE SCREEN, URINE Negative Positive(A)    BUPRENORPHINEUR Negative Negative    COCAINE SCREEN, URINE Negative Negative    METHADONE SCREEN, URINE Negative Negative    METHAMPHETAMINEUR Negative Negative        I have personally reviewed the above laboratory results.   ---------------------------------------------------------------------------------------------------------------------  Imaging Results (Last 7 Days)     ** No results found for the last 168 hours. **        I have personally reviewed the above radiology results.   ----------------------------------------------------------------------------------------------------------------------    Assessment/Plan     -Chronic cirrhosis of the liver 2/2 alcohol abuse  -pancytopenia  -elevated liver enzymes  -elevated bilirubin  -abnormal coagulation studies  Patient with c/o abdominal pain. Will obtain KUB.   Minimal ascites on abdominal US  Plateletes 46, PT/INR 21.1 and 1.77 respectively, Hbg 12. Monitor closely for s/sx of bleeding  Acute hepatitis/HIV  negative  Will add daily protonix  CBC in AM     -hypomagnesemia  -hypokalemia  Replaced in the ED  CMP in AM    -alcohol abuse (last drink just prior to ED presentation)  -history of substance abuse  -depression  -suicidal ideation  Being managed per primary care team  On CIWA protocol   We will add high dose thiamine and folate       Thank you for the consult and allowing us to participate in the care of your patient, Hospitalist Services will continue to follow. Please do not hesitate to call with any questions or concerns.      KEM Yu  05/12/22  16:03 EDT    Attestation: I personally discussed the patient's history of presenting illness, physical examination, assessment, and plan with my attending physician, Dr. Behbahani.

## 2022-05-12 NOTE — NURSING NOTE
Dr. Candi Reddy was paged in regards to Hospitalist consult, per Dr. Hernandez; awaiting call back.

## 2022-05-12 NOTE — NURSING NOTE
Dr. Candi Reddy, called back regarding hospitalist consult. Dr. Reddy reported that she would contact Dr. Behbahani regarding this consult. Awaiting call back from Dr. Behbahani.

## 2022-05-13 ENCOUNTER — APPOINTMENT (OUTPATIENT)
Dept: CT IMAGING | Facility: HOSPITAL | Age: 37
End: 2022-05-13

## 2022-05-13 ENCOUNTER — HOSPITAL ENCOUNTER (INPATIENT)
Facility: HOSPITAL | Age: 37
LOS: 7 days | Discharge: NURSING, REHAB OR LONG-TERM CARE | End: 2022-05-20
Attending: HOSPITALIST | Admitting: INTERNAL MEDICINE

## 2022-05-13 ENCOUNTER — PREP FOR SURGERY (OUTPATIENT)
Dept: OTHER | Facility: HOSPITAL | Age: 37
End: 2022-05-13

## 2022-05-13 VITALS
TEMPERATURE: 98.1 F | HEART RATE: 90 BPM | OXYGEN SATURATION: 99 % | WEIGHT: 216.8 LBS | SYSTOLIC BLOOD PRESSURE: 149 MMHG | DIASTOLIC BLOOD PRESSURE: 84 MMHG | BODY MASS INDEX: 29.36 KG/M2 | RESPIRATION RATE: 16 BRPM | HEIGHT: 72 IN

## 2022-05-13 PROBLEM — F43.10 POST TRAUMATIC STRESS DISORDER (PTSD): Status: ACTIVE | Noted: 2022-05-13

## 2022-05-13 PROBLEM — F10.20 ALCOHOL USE DISORDER, SEVERE, DEPENDENCE: Status: ACTIVE | Noted: 2022-05-13

## 2022-05-13 PROBLEM — F31.60 BIPOLAR AFFECTIVE DISORDER, MIXED: Status: ACTIVE | Noted: 2022-05-12

## 2022-05-13 PROBLEM — K76.82 HEPATIC ENCEPHALOPATHY: Status: ACTIVE | Noted: 2022-05-13

## 2022-05-13 LAB
ALBUMIN SERPL-MCNC: 2.48 G/DL (ref 3.5–5.2)
ALBUMIN/GLOB SERPL: 0.7 G/DL
ALP SERPL-CCNC: 89 U/L (ref 39–117)
ALT SERPL W P-5'-P-CCNC: 34 U/L (ref 1–41)
AMYLASE SERPL-CCNC: 38 U/L (ref 28–100)
ANION GAP SERPL CALCULATED.3IONS-SCNC: 5.9 MMOL/L (ref 5–15)
AST SERPL-CCNC: 64 U/L (ref 1–40)
BASOPHILS # BLD AUTO: 0.02 10*3/MM3 (ref 0–0.2)
BASOPHILS NFR BLD AUTO: 1.4 % (ref 0–1.5)
BILIRUB SERPL-MCNC: 5.1 MG/DL (ref 0–1.2)
BUN SERPL-MCNC: 11 MG/DL (ref 6–20)
BUN/CREAT SERPL: 17.2 (ref 7–25)
CALCIUM SPEC-SCNC: 8.2 MG/DL (ref 8.6–10.5)
CHLORIDE SERPL-SCNC: 109 MMOL/L (ref 98–107)
CO2 SERPL-SCNC: 24.1 MMOL/L (ref 22–29)
CREAT SERPL-MCNC: 0.64 MG/DL (ref 0.76–1.27)
CRP SERPL-MCNC: 0.42 MG/DL (ref 0–0.5)
D-LACTATE SERPL-SCNC: 2.8 MMOL/L (ref 0.5–2)
DEPRECATED RDW RBC AUTO: 63.6 FL (ref 37–54)
EGFRCR SERPLBLD CKD-EPI 2021: 126.6 ML/MIN/1.73
EOSINOPHIL # BLD AUTO: 0.11 10*3/MM3 (ref 0–0.4)
EOSINOPHIL NFR BLD AUTO: 7.7 % (ref 0.3–6.2)
ERYTHROCYTE [DISTWIDTH] IN BLOOD BY AUTOMATED COUNT: 17.3 % (ref 12.3–15.4)
FERRITIN SERPL-MCNC: 227.4 NG/ML (ref 30–400)
GLOBULIN UR ELPH-MCNC: 3.5 GM/DL
GLUCOSE SERPL-MCNC: 120 MG/DL (ref 65–99)
HCT VFR BLD AUTO: 30.9 % (ref 37.5–51)
HGB BLD-MCNC: 10 G/DL (ref 13–17.7)
IMM GRANULOCYTES # BLD AUTO: 0.01 10*3/MM3 (ref 0–0.05)
IMM GRANULOCYTES NFR BLD AUTO: 0.7 % (ref 0–0.5)
IRON 24H UR-MRATE: 151 MCG/DL (ref 59–158)
IRON SATN MFR SERPL: 84 % (ref 20–50)
LIPASE SERPL-CCNC: 90 U/L (ref 13–60)
LYMPHOCYTES # BLD AUTO: 0.29 10*3/MM3 (ref 0.7–3.1)
LYMPHOCYTES NFR BLD AUTO: 20.3 % (ref 19.6–45.3)
MAGNESIUM SERPL-MCNC: 1.3 MG/DL (ref 1.6–2.6)
MAGNESIUM SERPL-MCNC: 1.4 MG/DL (ref 1.6–2.6)
MCH RBC QN AUTO: 32.1 PG (ref 26.6–33)
MCHC RBC AUTO-ENTMCNC: 32.4 G/DL (ref 31.5–35.7)
MCV RBC AUTO: 99 FL (ref 79–97)
MONOCYTES # BLD AUTO: 0.23 10*3/MM3 (ref 0.1–0.9)
MONOCYTES NFR BLD AUTO: 16.1 % (ref 5–12)
NEUTROPHILS NFR BLD AUTO: 0.77 10*3/MM3 (ref 1.7–7)
NEUTROPHILS NFR BLD AUTO: 53.8 % (ref 42.7–76)
NRBC BLD AUTO-RTO: 0 /100 WBC (ref 0–0.2)
PHOSPHATE SERPL-MCNC: 2.8 MG/DL (ref 2.5–4.5)
PLATELET # BLD AUTO: 60 10*3/MM3 (ref 140–450)
PMV BLD AUTO: 11.1 FL (ref 6–12)
POTASSIUM SERPL-SCNC: 4.2 MMOL/L (ref 3.5–5.2)
PROCALCITONIN SERPL-MCNC: 0.07 NG/ML (ref 0–0.25)
PROT SERPL-MCNC: 6 G/DL (ref 6–8.5)
RBC # BLD AUTO: 3.12 10*6/MM3 (ref 4.14–5.8)
SODIUM SERPL-SCNC: 139 MMOL/L (ref 136–145)
TIBC SERPL-MCNC: 179 MCG/DL (ref 298–536)
TRANSFERRIN SERPL-MCNC: 120 MG/DL (ref 200–360)
WBC NRBC COR # BLD: 1.43 10*3/MM3 (ref 3.4–10.8)

## 2022-05-13 PROCEDURE — 86140 C-REACTIVE PROTEIN: CPT | Performed by: HOSPITALIST

## 2022-05-13 PROCEDURE — 99232 SBSQ HOSP IP/OBS MODERATE 35: CPT

## 2022-05-13 PROCEDURE — 83735 ASSAY OF MAGNESIUM: CPT | Performed by: INTERNAL MEDICINE

## 2022-05-13 PROCEDURE — 99222 1ST HOSP IP/OBS MODERATE 55: CPT | Performed by: HOSPITALIST

## 2022-05-13 PROCEDURE — 84100 ASSAY OF PHOSPHORUS: CPT | Performed by: INTERNAL MEDICINE

## 2022-05-13 PROCEDURE — 84145 PROCALCITONIN (PCT): CPT | Performed by: HOSPITALIST

## 2022-05-13 PROCEDURE — 83605 ASSAY OF LACTIC ACID: CPT | Performed by: HOSPITALIST

## 2022-05-13 PROCEDURE — 83690 ASSAY OF LIPASE: CPT

## 2022-05-13 PROCEDURE — 83735 ASSAY OF MAGNESIUM: CPT

## 2022-05-13 PROCEDURE — 82150 ASSAY OF AMYLASE: CPT

## 2022-05-13 PROCEDURE — 80053 COMPREHEN METABOLIC PANEL: CPT

## 2022-05-13 PROCEDURE — 74176 CT ABD & PELVIS W/O CONTRAST: CPT

## 2022-05-13 PROCEDURE — 82728 ASSAY OF FERRITIN: CPT | Performed by: INTERNAL MEDICINE

## 2022-05-13 PROCEDURE — 99223 1ST HOSP IP/OBS HIGH 75: CPT | Performed by: PSYCHIATRY & NEUROLOGY

## 2022-05-13 PROCEDURE — 84466 ASSAY OF TRANSFERRIN: CPT | Performed by: INTERNAL MEDICINE

## 2022-05-13 PROCEDURE — 83540 ASSAY OF IRON: CPT | Performed by: INTERNAL MEDICINE

## 2022-05-13 PROCEDURE — 85025 COMPLETE CBC W/AUTO DIFF WBC: CPT

## 2022-05-13 RX ORDER — MAGNESIUM SULFATE HEPTAHYDRATE 40 MG/ML
2 INJECTION, SOLUTION INTRAVENOUS AS NEEDED
Status: DISCONTINUED | OUTPATIENT
Start: 2022-05-13 | End: 2022-05-20 | Stop reason: HOSPADM

## 2022-05-13 RX ORDER — ALBUMIN (HUMAN) 12.5 G/50ML
25 SOLUTION INTRAVENOUS
Status: DISCONTINUED | OUTPATIENT
Start: 2022-05-13 | End: 2022-05-13 | Stop reason: HOSPADM

## 2022-05-13 RX ORDER — LORAZEPAM 2 MG/1
2 TABLET ORAL
Status: COMPLETED | OUTPATIENT
Start: 2022-05-13 | End: 2022-05-13

## 2022-05-13 RX ORDER — PRAZOSIN HYDROCHLORIDE 1 MG/1
1 CAPSULE ORAL NIGHTLY
Status: DISCONTINUED | OUTPATIENT
Start: 2022-05-13 | End: 2022-05-13 | Stop reason: HOSPADM

## 2022-05-13 RX ORDER — ALBUMIN (HUMAN) 12.5 G/50ML
25 SOLUTION INTRAVENOUS
Status: DISCONTINUED | OUTPATIENT
Start: 2022-05-14 | End: 2022-05-13 | Stop reason: HOSPADM

## 2022-05-13 RX ORDER — ALBUMIN (HUMAN) 12.5 G/50ML
25 SOLUTION INTRAVENOUS
Status: DISCONTINUED | OUTPATIENT
Start: 2022-05-15 | End: 2022-05-13 | Stop reason: HOSPADM

## 2022-05-13 RX ORDER — CLONIDINE HYDROCHLORIDE 0.1 MG/1
0.1 TABLET ORAL 4 TIMES DAILY PRN
Status: ACTIVE | OUTPATIENT
Start: 2022-05-13 | End: 2022-05-14

## 2022-05-13 RX ORDER — LORAZEPAM 1 MG/1
1 TABLET ORAL EVERY 4 HOURS PRN
Status: DISPENSED | OUTPATIENT
Start: 2022-05-15 | End: 2022-05-16

## 2022-05-13 RX ORDER — SODIUM CHLORIDE 0.9 % (FLUSH) 0.9 %
10 SYRINGE (ML) INJECTION AS NEEDED
Status: CANCELLED | OUTPATIENT
Start: 2022-05-13

## 2022-05-13 RX ORDER — HEPARIN SODIUM 5000 [USP'U]/ML
5000 INJECTION, SOLUTION INTRAVENOUS; SUBCUTANEOUS EVERY 8 HOURS SCHEDULED
Status: CANCELLED | OUTPATIENT
Start: 2022-05-13

## 2022-05-13 RX ORDER — LORAZEPAM 2 MG/1
2 TABLET ORAL EVERY 4 HOURS PRN
Status: DISPENSED | OUTPATIENT
Start: 2022-05-13 | End: 2022-05-14

## 2022-05-13 RX ORDER — FOLIC ACID 1 MG/1
1 TABLET ORAL DAILY
Status: DISCONTINUED | OUTPATIENT
Start: 2022-05-14 | End: 2022-05-20 | Stop reason: HOSPADM

## 2022-05-13 RX ORDER — CLONIDINE HYDROCHLORIDE 0.1 MG/1
0.1 TABLET ORAL 2 TIMES DAILY PRN
Status: ACTIVE | OUTPATIENT
Start: 2022-05-15 | End: 2022-05-16

## 2022-05-13 RX ORDER — ALBUMIN (HUMAN) 12.5 G/50ML
25 SOLUTION INTRAVENOUS
Status: COMPLETED | OUTPATIENT
Start: 2022-05-13 | End: 2022-05-14

## 2022-05-13 RX ORDER — FLUOXETINE HYDROCHLORIDE 20 MG/1
20 CAPSULE ORAL DAILY
Status: ON HOLD
Start: 2022-05-14 | End: 2022-05-13

## 2022-05-13 RX ORDER — MULTIVITAMIN WITH IRON
2 TABLET ORAL DAILY
Status: DISCONTINUED | OUTPATIENT
Start: 2022-05-14 | End: 2022-05-20 | Stop reason: HOSPADM

## 2022-05-13 RX ORDER — NICOTINE 21 MG/24HR
1 PATCH, TRANSDERMAL 24 HOURS TRANSDERMAL
Status: DISCONTINUED | OUTPATIENT
Start: 2022-05-14 | End: 2022-05-20 | Stop reason: HOSPADM

## 2022-05-13 RX ORDER — POTASSIUM CHLORIDE 1.5 G/1.77G
40 POWDER, FOR SOLUTION ORAL AS NEEDED
Status: DISCONTINUED | OUTPATIENT
Start: 2022-05-13 | End: 2022-05-20 | Stop reason: HOSPADM

## 2022-05-13 RX ORDER — MAGNESIUM SULFATE HEPTAHYDRATE 40 MG/ML
4 INJECTION, SOLUTION INTRAVENOUS AS NEEDED
Status: DISCONTINUED | OUTPATIENT
Start: 2022-05-13 | End: 2022-05-20 | Stop reason: HOSPADM

## 2022-05-13 RX ORDER — PRAZOSIN HYDROCHLORIDE 1 MG/1
1 CAPSULE ORAL NIGHTLY
Status: ON HOLD
Start: 2022-05-13 | End: 2022-05-13

## 2022-05-13 RX ORDER — ALBUMIN (HUMAN) 12.5 G/50ML
25 SOLUTION INTRAVENOUS
Status: COMPLETED | OUTPATIENT
Start: 2022-05-15 | End: 2022-05-15

## 2022-05-13 RX ORDER — ALBUMIN (HUMAN) 12.5 G/50ML
25 SOLUTION INTRAVENOUS
Status: DISCONTINUED | OUTPATIENT
Start: 2022-05-13 | End: 2022-05-13

## 2022-05-13 RX ORDER — FLUOXETINE HYDROCHLORIDE 20 MG/1
20 CAPSULE ORAL DAILY
Status: DISCONTINUED | OUTPATIENT
Start: 2022-05-13 | End: 2022-05-13 | Stop reason: HOSPADM

## 2022-05-13 RX ORDER — POTASSIUM CHLORIDE 20 MEQ/1
40 TABLET, EXTENDED RELEASE ORAL AS NEEDED
Status: DISCONTINUED | OUTPATIENT
Start: 2022-05-13 | End: 2022-05-20 | Stop reason: HOSPADM

## 2022-05-13 RX ORDER — SODIUM CHLORIDE 0.9 % (FLUSH) 0.9 %
10 SYRINGE (ML) INJECTION EVERY 12 HOURS SCHEDULED
Status: CANCELLED | OUTPATIENT
Start: 2022-05-13

## 2022-05-13 RX ORDER — OLANZAPINE 5 MG/1
5 TABLET ORAL NIGHTLY
Status: ON HOLD
Start: 2022-05-13 | End: 2022-05-13

## 2022-05-13 RX ORDER — ALBUMIN (HUMAN) 12.5 G/50ML
25 SOLUTION INTRAVENOUS EVERY 6 HOURS
Status: DISCONTINUED | OUTPATIENT
Start: 2022-05-16 | End: 2022-05-13

## 2022-05-13 RX ORDER — CLONIDINE HYDROCHLORIDE 0.1 MG/1
0.1 TABLET ORAL DAILY PRN
Status: ACTIVE | OUTPATIENT
Start: 2022-05-16 | End: 2022-05-17

## 2022-05-13 RX ORDER — MAGNESIUM SULFATE HEPTAHYDRATE 40 MG/ML
2 INJECTION, SOLUTION INTRAVENOUS
Status: DISPENSED | OUTPATIENT
Start: 2022-05-13 | End: 2022-05-14

## 2022-05-13 RX ORDER — POTASSIUM CHLORIDE 7.45 MG/ML
10 INJECTION INTRAVENOUS
Status: DISCONTINUED | OUTPATIENT
Start: 2022-05-13 | End: 2022-05-20 | Stop reason: HOSPADM

## 2022-05-13 RX ORDER — ALBUMIN (HUMAN) 12.5 G/50ML
25 SOLUTION INTRAVENOUS EVERY 4 HOURS
Status: DISCONTINUED | OUTPATIENT
Start: 2022-05-14 | End: 2022-05-13

## 2022-05-13 RX ORDER — CLONIDINE HYDROCHLORIDE 0.1 MG/1
0.1 TABLET ORAL 3 TIMES DAILY PRN
Status: ACTIVE | OUTPATIENT
Start: 2022-05-14 | End: 2022-05-15

## 2022-05-13 RX ORDER — LORAZEPAM 0.5 MG/1
0.5 TABLET ORAL
Status: COMPLETED | OUTPATIENT
Start: 2022-05-16 | End: 2022-05-16

## 2022-05-13 RX ORDER — LORAZEPAM 0.5 MG/1
0.5 TABLET ORAL EVERY 4 HOURS PRN
Status: DISPENSED | OUTPATIENT
Start: 2022-05-16 | End: 2022-05-17

## 2022-05-13 RX ORDER — LORAZEPAM 1 MG/1
1 TABLET ORAL
Status: COMPLETED | OUTPATIENT
Start: 2022-05-15 | End: 2022-05-15

## 2022-05-13 RX ORDER — OLANZAPINE 5 MG/1
5 TABLET ORAL NIGHTLY
Status: DISCONTINUED | OUTPATIENT
Start: 2022-05-13 | End: 2022-05-13 | Stop reason: HOSPADM

## 2022-05-13 RX ADMIN — Medication 1 TABLET: at 08:43

## 2022-05-13 RX ADMIN — LORAZEPAM 2 MG: 2 TABLET ORAL at 22:25

## 2022-05-13 RX ADMIN — LORAZEPAM 2 MG: 2 TABLET ORAL at 08:41

## 2022-05-13 RX ADMIN — HYDROXYZINE HYDROCHLORIDE 50 MG: 50 TABLET ORAL at 07:34

## 2022-05-13 RX ADMIN — POTASSIUM & SODIUM PHOSPHATES POWDER PACK 280-160-250 MG 2 PACKET: 280-160-250 PACK at 16:45

## 2022-05-13 RX ADMIN — Medication 500 MG: at 08:42

## 2022-05-13 RX ADMIN — Medication 1 PATCH: at 08:52

## 2022-05-13 RX ADMIN — FOLIC ACID 1 MG: 1 TABLET ORAL at 08:42

## 2022-05-13 RX ADMIN — MAGNESIUM GLUCONATE 500 MG ORAL TABLET 800 MG: 500 TABLET ORAL at 08:42

## 2022-05-13 RX ADMIN — Medication 2 TABLET: at 08:42

## 2022-05-13 RX ADMIN — POTASSIUM & SODIUM PHOSPHATES POWDER PACK 280-160-250 MG 2 PACKET: 280-160-250 PACK at 08:00

## 2022-05-13 RX ADMIN — LORAZEPAM 2 MG: 2 TABLET ORAL at 15:42

## 2022-05-13 RX ADMIN — MAGNESIUM GLUCONATE 500 MG ORAL TABLET 800 MG: 500 TABLET ORAL at 15:41

## 2022-05-13 RX ADMIN — IBUPROFEN 400 MG: 400 TABLET, FILM COATED ORAL at 07:34

## 2022-05-13 RX ADMIN — POTASSIUM & SODIUM PHOSPHATES POWDER PACK 280-160-250 MG 2 PACKET: 280-160-250 PACK at 11:35

## 2022-05-13 RX ADMIN — LORAZEPAM 2 MG: 2 TABLET ORAL at 07:34

## 2022-05-13 RX ADMIN — FLUOXETINE HYDROCHLORIDE 20 MG: 20 CAPSULE ORAL at 10:18

## 2022-05-13 RX ADMIN — PANTOPRAZOLE SODIUM 40 MG: 40 TABLET, DELAYED RELEASE ORAL at 06:09

## 2022-05-13 NOTE — PROGRESS NOTES
1620: Received call from Tra. He screened patient and will staff with Rem[facility] about possibly admitting. Treatment team to call Monday to further coordinate.     1610: Navigator is helping Primary Therapist with discharge planning for patient. Navigator contacted [facility] and spoke with Tra. He will complete phone assessment with patient today. Transferred call to nurse station. Tra to call back with admission decision.     [address] - [phone]

## 2022-05-13 NOTE — PLAN OF CARE
Goal Outcome Evaluation:  Plan of Care Reviewed With: patient  Patient Agreement with Plan of Care: agrees     Progress: no change  Outcome Evaluation: Rated anxiety as 5 and depression as 7. Denies S.I., paranoia, hallucinations. Alert and oriented. Denies withdrawal symptoms. Says he feels physically bad. Withdrawn to room most of shift.

## 2022-05-13 NOTE — H&P
INITIAL PSYCHIATRIC HISTORY & PHYSICAL    Patient Identification:  Name:  Wilfredo Lau  Age:  36 y.o.  Sex:  male  :  1985  MRN:  [id number]   Visit Number:  [id number]  Primary Care Physician:  Provider, No Known    SUBJECTIVE    CC/Focus of Exam: Psychosis    HPI: Wilfredo Lau is a 36 y.o. male who was admitted on 2022 with complaints of psychosis.  Patient was reportedly dropped off by a local rehab.  It appears he visited the ED 5 days ago but left AMA without being seen by provider.  In the ED, patient was agitated and appeared delirious, either because of alcohol withdrawal or psychosis combination of both.  Patient combative in the ED, believing he was in hell, asking if he had killed himself or anyone else.  He exhibited paranoid behavior toward staff.  He required medication to maintain his personal safety.    On reassessment later, patient was more calm.  He still exhibited some hyperreligious and delusional thinking, but was less agitated and was able to provide more history.  He reports being actively suicidal for several weeks.  He reports a history of bipolar disorder and PTSD.  He reports history of serving in the , having spent time in [employer], [employer], [employer] and otherwise.  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  Unclear if the above reports are delusional in nature.  Patient does report recent history of mood disturbance, saying he has been off of his medication.  Mood has been labile, increased anxiety, increased insomnia, distractibility, paranoia, grandiosity, SI.  Symptoms are severe, persistent, present multiple settings, worse in the last 2 weeks, worse by drugs and lack of treatment, improved by treatment.    PAST PSYCHIATRIC HX:  Dx: Bipolar, PTSD  IP: [facility]  OP: None currently  Current meds: None currently  Previous meds: Fluoxetine,  Depakote, Ativan, prazosin, trazodone, others that he cannot recall  SH/SI/SA: Endorsed x3  Trauma: Endorsed    SUBSTANCE USE HX:  Recent heavy use of alcohol and illicit drugs.  Plans to go to rehab following hospitalization  Admission UDS positive for benzodiazepine    SOCIAL HX:  Grew up in [place], most recently in [facility].  Moved to [place] 2 weeks ago for a girlfriend, a relationship which ended soon after he arrived.  Served in the .  More information HPI    FAMILY HX:    History reviewed. No pertinent family history.    Past Medical History:   Diagnosis Date   • Alcohol abuse    • Cirrhosis (HCC)    • HTN (hypertension)    • Liver disease    • Psychosis (HCC)    • Substance abuse (HCC)    • Withdrawal symptoms, alcohol (HCC)    • Withdrawal symptoms, drug or narcotic (HCC)      History reviewed. No pertinent surgical history.    No medications prior to admission.     ALLERGIES:  Penicillins    Temp:  [97.6 °F (36.4 °C)-98.2 °F (36.8 °C)] 97.6 °F (36.4 °C)  Heart Rate:  [60-83] 72  Resp:  [18] 18  BP: (104-149)/(55-94) 129/79    REVIEW OF SYSTEMS:  Review of Systems   Psychiatric/Behavioral: Positive for agitation, behavioral problems, confusion, dysphoric mood, hallucinations, sleep disturbance and suicidal ideas. The patient is nervous/anxious and is hyperactive.    All other systems reviewed and are negative.       OBJECTIVE    PHYSICAL EXAM:  Physical Exam  Vitals and nursing note reviewed.   Constitutional:       Appearance: He is well-developed.   HENT:      Head: Normocephalic and atraumatic.      Right Ear: External ear normal.      Left Ear: External ear normal.      Nose: Nose normal.   Eyes:      Pupils: Pupils are equal, round, and reactive to light.   Pulmonary:      Effort: Pulmonary effort is normal. No respiratory distress.      Breath sounds: Normal breath sounds.   Abdominal:      General: There is no distension.      Palpations: Abdomen is soft.   Musculoskeletal:          General: No deformity. Normal range of motion.      Cervical back: Normal range of motion and neck supple.   Skin:     General: Skin is warm.      Findings: No rash.   Neurological:      Mental Status: He is alert and oriented to person, place, and time.      Coordination: Coordination normal.         MENTAL STATUS EXAM:   Hygiene:   fair  Cooperation:  Aggressive  Eye Contact:  Fair  Psychomotor Behavior:  Aggitated  Affect:  Elevated, labile  Hopelessness: 6  Speech:  Pressured  Thought Process: Disorganized  Thought Content:  Bizarre  Suicidal:  Suicidal Ideation and Suicidal plan  Homicidal:  None  Hallucinations:  Auditory  Delusion:  Paranoid and Grandiose  Memory:  Deficits  Orientation:  Person  Reliability:  poor  Insight:  Poor  Judgment:  Poor  Impulse Control:  Poor      Imaging Results (Last 24 Hours)     Procedure Component Value Units Date/Time    XR Abdomen KUB [[id number]] Collected: 05/12/22 2009     Updated: 05/12/22 2011    Narrative:      CR Abdomen 1 Vw    INDICATION:   Abdominal pain tonight    COMPARISON:   None available    FINDINGS:  AP radiograph(s) of the abdomen. The renal shadows are symmetric. No renal calculi. No bladder calculi.    The bowel gas pattern is nonobstructive. No acute osseous abnormalities. No radiopaque foreign body.      Impression:      Negative KUB.    Signer Name: Vladimir An MD   Signed: 5/12/2022 8:09 PM   Workstation Name: [facility]    [facility]           Lab Results   Component Value Date    GLUCOSE 101 (H) 05/12/2022    BUN 6 05/12/2022    CREATININE 0.74 (L) 05/12/2022    BCR 8.1 05/12/2022    CO2 22.6 05/12/2022    CALCIUM 9.0 05/12/2022    ALBUMIN 3.65 05/12/2022     (H) 05/12/2022    ALT 52 (H) 05/12/2022       Lab Results   Component Value Date    WBC 1.43 (C) 05/13/2022    HGB 10.0 (L) 05/13/2022    HCT 30.9 (L) 05/13/2022    MCV 99.0 (H) 05/13/2022    PLT 60 (L) 05/13/2022       ECG/EMG Results (most recent)      Procedure Component Value Units Date/Time    ECG 12 Lead [[id number]] Collected: 05/12/22 1653     Updated: 05/12/22 2049     QT Interval 512 ms      QTC Interval 476 ms     Narrative:      Test Reason : Baseline Cardiac Status  Blood Pressure :   */*   mmHG  Vent. Rate :  52 BPM     Atrial Rate :  52 BPM     P-R Int : 150 ms          QRS Dur :  96 ms      QT Int : 512 ms       P-R-T Axes :  20   1  36 degrees     QTc Int : 476 ms    Sinus bradycardia  Otherwise normal ECG  Confirmed by Dionicio King ([id number]) on 5/12/2022 8:49:35 PM    Referred By: BETHANY           Confirmed By: Dionicio King           Brief Urine Lab Results  (Last result in the past 365 days)      Color   Clarity   Blood   Leuk Est   Nitrite   Protein   CREAT   Urine HCG        05/12/22 1013 Orange  Comment: Dipstick results may be inaccurate due to color interference.    Clear   Negative   Trace   Positive   Negative                 Last Urine Toxicity     LAST URINE TOXICITY RESULTS Latest Ref Rng & Units 5/12/2022    AMPHETAMINES SCREEN, URINE Negative Negative    BARBITURATES SCREEN Negative Negative    BENZODIAZEPINE SCREEN, URINE Negative Positive(A)    BUPRENORPHINEUR Negative Negative    COCAINE SCREEN, URINE Negative Negative    METHADONE SCREEN, URINE Negative Negative    METHAMPHETAMINEUR Negative Negative          Chart, notes, vitals, labs personally reviewed.  WBC 1.43, magnesium 1.4, H/H 10.0/30.9, MCV 99.0, platelets 60  UA trace leukocytes, positive nitrite  Outside ROSEANN report requested, reviewed  UDS results:  + Benzodiazepine  EKG tracing personally reviewed, interpreted as normal rhythm, bradycardic at 52, QTc interval 476  Abdominal x-ray personally reviewed, no acute findings  Consulted with patient's therapist regarding clinical history and treatment plan    ASSESSMENT & PLAN:    Psychosis, unspecified  -Potentially related to previously diagnosed bipolar disorder.  Likely influenced by recent drug and alcohol use and  withdrawal  -We will continue to assess as patient completes treatment    Bipolar 1 disorder, current episode manic, severe, recurrent, with psychosis  -Assessment will be ongoing as patient recovers from drug and alcohol use/withdrawal  -Begin olanzapine 5 mg nightly with plan to transition to Lybalvi  -Begin fluoxetine 20 mg daily  -We will establish [facility]    Posttraumatic stress disorder, acute on chronic  -Medication as above  -Begin prazosin 1 mg nightly  -Outpatient care as above, including trauma focused therapy    Alcohol use disorder, severe, dependence, in withdrawal  -Begin Ativan detox protocol  -Supplementary vitamins and comfort meds ordered  -We will refer to appropriate rehab following hospitalization    Chronic cirrhosis  -Repeat platelets 60, PT/INR 21.1/1.77, H/H10.0/30.9  -Hepatitis/HIV negative  -Consulted hospitalist.  Appreciate involvement    Hypomagnesemia  -P.o. replacement  -Repeat still low at 1.4    Hypokalemia  -Replaced in the ED  -Corrected    Hyperbilirubinemia  -Total bilirubin 5.1  -Defer to hospitalist    The patient has been admitted for safety and stabilization.  Patient will be monitored for suicidality daily and maintained on Special Precautions Level 3 (q15 min checks) .  The patient will have individual and group therapy with a master's level therapist. A master treatment plan will be developed and agreed upon by the patient and his/her treatment team.  The patient's estimated length of stay in the hospital is 5-7 days.

## 2022-05-13 NOTE — NURSING NOTE
Hospitalist called at this time. Advised pt will be moved to PCU for medical treatment.    Contact made with Dr. Lai at this time. New order to Discharge patient to medical.  [id number]    House Supervisor called at this time bed assigned room 215,

## 2022-05-13 NOTE — NURSING NOTE
Report called to Tiana on PCU at this time. Pt denies SI/ HI. Pt agreeable for transfer.      Patient updated on plan of care.

## 2022-05-13 NOTE — PROGRESS NOTES
Patient Identification:  Name:  Wilfredo Lau  Age:  36 y.o.  Sex:  male  :  1985  MRN:  [id number]  Visit Number:  [id number]  Primary Care Provider:  Provider, No Known    Length of stay:  1    Subjective/Interval History/Consultants/Procedures     Chief Complaint:  No chief complaint on file.        Requesting Provider: Dr. Hernandez     Reason for Consultation: medical management of chronic live cirrhosis         History of presenting illness:     Wilfredo Lau is a 36 y.o. male admitted by Dr. Hernandez to the [facility] for depression and suicidal ideation. The patient is from [place]. He has only been in the area for approximately 1 week. He has a longstanding history of alcohol substance abuse. He states that he drinks 1-2 pints of vodka daily. He last used illicit Fentynal approximately 3 months ago. He is aware that he has cirrhosis of the liver.   After reviewing the medical records, the patient had a presentation at the ED on 2022 with intoxication, however the patient left AMA without being seen by a provider. At that time his ethanol level was elevated to 24 and his magnesium was 1.1   On admission the patient was noted to be pancytopenic with a WBC of 1.72, platelets 46, hbg 12, PT 21.1 INR 1.77. His magnesium was 1.2. On initial evaluation the patient was pleasant and able to answer questions appropriately. He complained of general abdominal pain, we obtained a KUB that had no acute findings. The patient was being monitored closely by his primary team for acute alcohol withdraw.     Today, the patients WBC is 1.43, platelets have increased to 60, he is without overt signs or symptoms of bleeding. His magnesium was 1.4. Bilirubin decreased to 5.1. Liver enzymes trending down. Alt and AST are 34 and 64 respectively.  Today the patient was resting in bed. I was able to note that his skin and scelara were slightly yellow in nature today. He was alert and oriented during my evaluation. He  continued to complain of abdominal pain. Light palpitation illicited significant tenderness around the umbilicus.  He denies nausea, vomiting, diarrhea. He did have a bowel movement, it was formed, without mucous or blood. He also has a notable tremor during my exam. He denies any further complaints.       ----------------------------------------------------------------------------------------------------------------------  [facility] Meds:  B-complex with vitamin C, 2 tablet, Oral, Daily  FLUoxetine, 20 mg, Oral, Daily  folic acid, 1 mg, Oral, Daily  LORazepam, 2 mg, Oral, 3 times per day   Followed by  [START ON 5/14/2022] LORazepam, 1.5 mg, Oral, 3 times per day   Followed by  [START ON 5/15/2022] LORazepam, 1 mg, Oral, 3 times per day   Followed by  [START ON 5/16/2022] LORazepam, 0.5 mg, Oral, 3 times per day  magnesium oxide, 800 mg, Oral, TID  multivitamin with minerals, 1 tablet, Oral, Daily  nicotine, 1 patch, Transdermal, Q24H  OLANZapine, 5 mg, Oral, Nightly  pantoprazole, 40 mg, Oral, Q AM  potassium & sodium phosphates, 2 packet, Oral, TID AC  prazosin, 1 mg, Oral, Nightly  [START ON 5/15/2022] vitamin B-1, 100 mg, Oral, Daily  vitamin B-1, 500 mg, Oral, TID         ----------------------------------------------------------------------------------------------------------------------      Objective     Vital Signs:  Temp:  [97.6 °F (36.4 °C)-98 °F (36.7 °C)] 97.8 °F (36.6 °C)  Heart Rate:  [66-96] 96  Resp:  [18] 18  BP: (127-140)/(60-88) 127/74      05/12/22  1315   Weight: 98.3 kg (216 lb 12.8 oz)     Body mass index is 29.4 kg/m².  No intake or output data in the 24 hours ending 05/13/22 1608  No intake/output data recorded.  Diet Regular; Low Sodium; 2,000 mg Na  ----------------------------------------------------------------------------------------------------------------------    Physical Exam  Vitals and nursing note reviewed.   Constitutional:       Appearance: He is ill-appearing and  toxic-appearing.   HENT:      Head: Normocephalic and atraumatic.   Eyes:      Comments: Mild yellow discoloration of sclera.    Cardiovascular:      Rate and Rhythm: Regular rhythm. Tachycardia present.      Heart sounds: Normal heart sounds, S1 normal and S2 normal.   Pulmonary:      Effort: Pulmonary effort is normal.      Breath sounds: Normal breath sounds.   Abdominal:      General: Abdomen is flat. Bowel sounds are normal.      Palpations: Abdomen is soft.      Tenderness: There is abdominal tenderness in the periumbilical area.   Neurological:      General: No focal deficit present.      Mental Status: He is alert and oriented to person, place, and time.      GCS: GCS eye subscore is 4. GCS verbal subscore is 5. GCS motor subscore is 6.      Motor: Weakness and tremor present.   Psychiatric:         Attention and Perception: Attention normal.         Behavior: Behavior is cooperative.         ----------------------------------------------------------------------------------------------------------------------  Results from last 7 days   Lab Units 05/08/22  0016   TROPONIN T ng/mL <0.010     Results from last 7 days   Lab Units 05/13/22  0711 05/12/22  1048 05/12/22  0748 05/12/22  0650 05/08/22  0016   CRP mg/dL  --   --   --  0.65*  --    LACTATE mmol/L  --  1.3 2.1*  --   --    WBC 10*3/mm3 1.43*  --   --  1.72*  --    HEMOGLOBIN g/dL 10.0*  --   --  12.0*  --    HEMATOCRIT % 30.9*  --   --  36.6*  --    MCV fL 99.0*  --   --  98.7*  --    MCHC g/dL 32.4  --   --  32.8  --    PLATELETS 10*3/mm3 60*  --   --  46*  --    INR   --   --   --  1.77* 1.78*         Results from last 7 days   Lab Units 05/13/22  0711 05/12/22  0650 05/08/22  0016   SODIUM mmol/L 139 140 139   POTASSIUM mmol/L 4.2 3.1* 3.6   MAGNESIUM mg/dL 1.4* 1.2* 1.1*   CHLORIDE mmol/L 109* 103 103   CO2 mmol/L 24.1 22.6 23.7   BUN mg/dL 11 6 5*   CREATININE mg/dL 0.64* 0.74* 0.55*   CALCIUM mg/dL 8.2* 9.0 8.3*   GLUCOSE mg/dL 120* 101* 136*    ALBUMIN g/dL 2.48* 3.65 2.99*   BILIRUBIN mg/dL 5.1* 9.5* 5.2*   ALK PHOS U/L 89 116 87   AST (SGOT) U/L 64* 106* 92*   ALT (SGPT) U/L 34 52* 46*   Estimated Creatinine Clearance: 195.7 mL/min (A) (by C-G formula based on SCr of 0.64 mg/dL (L)).  Ammonia   Date Value Ref Range Status   05/12/2022 32 16 - 60 umol/L Final         Blood Culture   Date Value Ref Range Status   05/12/2022 No growth at 24 hours  Preliminary   05/12/2022 No growth at 24 hours  Preliminary     No results found for: URINECX  No results found for: WOUNDCX  No results found for: STOOLCX  ----------------------------------------------------------------------------------------------------------------------  Imaging Results (Last 24 Hours)     Procedure Component Value Units Date/Time    XR Abdomen KUB [[id number]] Collected: 05/12/22 2009     Updated: 05/12/22 2011    Narrative:      CR Abdomen 1 Vw    INDICATION:   Abdominal pain tonight    COMPARISON:   None available    FINDINGS:  AP radiograph(s) of the abdomen. The renal shadows are symmetric. No renal calculi. No bladder calculi.    The bowel gas pattern is nonobstructive. No acute osseous abnormalities. No radiopaque foreign body.      Impression:      Negative KUB.    Signer Name: Vladimir An MD   Signed: 5/12/2022 8:09 PM   Workstation Name: [email or web address]    [facility]        ----------------------------------------------------------------------------------------------------------------------   I have reviewed the above laboratory values for 05/13/22    Assessment/Plan     Active Hospital Problems    Diagnosis  POA   • Post traumatic stress disorder (PTSD) [F43.10]  Yes   • Alcohol use disorder, severe, dependence (HCC) [F10.20]  Yes   • Bipolar affective disorder, mixed (HCC) [F31.60]  Yes     -abdominal pain   Periumbilical in location  Tender to palpation  CT of abdomen pending  Checking amylase and lipase    Chronic cirrhosis of the liver 2/2 alcohol  abuse  -pancytopenia  -elevated liver enzymes  -elevated bilirubin  -abnormal coagulation studies  Minimal ascites on abdominal US  Acute hepatitis/HIV negative  Will add daily protonix  Improvement in platelets, Bili, and liver enzymes  WBC decreased slight. This is expected with liver cirrhosis.   CBC in AM      -hypomagnesemia  -hypokalemia  Magnesium replaced again today   CMP in AM  Magnesium in AM     -alcohol abuse (last drink just prior to ED presentation)  -history of substance abuse  -depression  -suicidal ideation  Being managed per primary care team  On CIWA protocol   We will add high dose thiamine and folate         Thank you for the consult and allowing us to participate in the care of your patient, Hospitalist Services will continue to follow. Please do not hesitate to call with any questions or concerns.    KEM Yu  05/13/22  16:08 EDT

## 2022-05-13 NOTE — NURSING NOTE
"Spoke with Mallory at [facility] in attempt to present pt.  Advised \"based upon medical records reviewed yesterday pt was not appropriate for transfer when presented as he could not answer any questions\". I advised pt is cooperative and agreeable for transfer.    Mallory then advised at this time- [facility] not [facility] at this time. Nurse may represent on Monday.    Dr. Lai updated on plan at this time.   Nhi updated at this time       "

## 2022-05-13 NOTE — PLAN OF CARE
Goal Outcome Evaluation:  Plan of Care Reviewed With: patient  Patient Agreement with Plan of Care: agrees  [facility]  Progress: improving  Outcome Evaluation: Therapist met with Patient to review care plan, social history, and aftercare recommendations; Patient agreeable.      Problem: Adult Behavioral Health Plan of Care  Goal: Plan of Care Review  Outcome: Ongoing, Progressing  Flowsheets (Taken 5/13/2022 1115)  [facility] Given to [facility] Plan [facility]: [facility]  Progress: improving  Plan of Care Reviewed With: patient  Patient Agreement with Plan of Care: agrees  Outcome Evaluation:  • Therapist met with Patient to review care plan, social history, and aftercare recommendations  • Patient agreeable.     Problem: Adult Behavioral Health Plan of Care  Goal: Patient-Specific Goal (Individualization)  Outcome: Ongoing, Progressing  Flowsheets  Taken 5/13/2022 1115  Patient-Specific Goals (Include Timeframe): Identify 2-3 coping skills, address relapse prevetions methods, complete aftercare plans, and deny sI/HI prior to discharge,  Individualized Care Needs: Therapist to offer 1-4 therapy sessions, aftercare planning, safety planning, family education, group therapy, and brief CBT/MI interventions.  Anxieties, Fears or Concerns: none voiced  Taken 5/13/2022 1004  Patient Personal Strengths:  • resilient  • resourceful  • self-reliant  Patient Vulnerabilities:  • poor impulse control  • family/relationship conflict  • substance abuse/addiction  • limited support system  • occupational insecurity  • housing insecurity     Problem: Adult Behavioral Health Plan of Care  Goal: Optimized Coping Skills in Response to Life Stressors  Outcome: Ongoing, Progressing  Flowsheets (Taken 5/13/2022 1115)  Optimized Coping Skills in Response to Life Stressors: making progress toward outcome  Intervention: Promote Effective Coping Strategies  Flowsheets (Taken 5/13/2022 1115)  Supportive Measures:  • active  listening utilized  • counseling provided  • goal-setting facilitated  • verbalization of feelings encouraged     Problem: Adult Behavioral Health Plan of Care  Goal: Develops/Participates in Therapeutic [place] to Support Successful Transition  Outcome: Ongoing, Progressing  Flowsheets (Taken 5/13/2022 1115)  Develops/Participates in Therapeutic [place] to Support Successful Transition: making progress toward outcome  Intervention: Foster Therapeutic [place]  Flowsheets (Taken 5/13/2022 1115)  Trust Relationship/Rapport:  • care explained  • questions encouraged  • choices provided  • reassurance provided  • emotional support provided  • thoughts/feelings acknowledged  • empathic listening provided  • questions answered  Intervention: Mutually Develop Transition Plan  Flowsheets  Taken 5/13/2022 1115 by Nhi Topete MSW  Outpatient/Agency/Support Group Needs: residential services  Discharge Coordination/Progress:  • Therapist met with Patient to complete discharge needs assessment  • Patient agreeable.  Transition Support: follow-up care discussed  Transportation Anticipated: agency  Anticipated Discharge Disposition:  • residential substance use unit  • psychiatric hospital  Transportation Concerns: no car  Current Discharge Risk:  • psychiatric illness  • substance use/abuse  Concerns to be Addressed:  • coping/stress  • substance/tobacco abuse/use  • mental health  Readmission Within the Last 30 Days: no previous admission in last 30 days  Patient's Choice of Community Agency(s): [facility] their [facility]  Offered/Gave Vendor List: no  Taken 5/12/2022 1459 by Lolita Gómez, RN  Patient/Family Anticipated Services at Transition: rehabilitation services  Patient/Family Anticipates Transition to: inpatient rehabilitation facility     DATA: Therapist met individually with patient this date to introduce role and to discuss hospitalization expectations. Patient  agreeable.     Patient states that [facility] then [facility] their residential program after discharge. This therapist called and spoke with lead RN Isa, as well as Dr. Lai about this. RN to call [facility] and see if this can be done for the Patient.     16:27 [facility] [place]. Patient signed consent for [facility] and reports that he would like to go here at discharge for substance use treatment.     Patient declines family involvement at this time.      Clinical Maneuvering/Intervention:     Therapist assisted patient in processing above session content; acknowledged and normalized patient’s thoughts, feelings, and concerns.  Discussed the therapist/patient relationship and explain the parameters and limitations of relative confidentiality.  Also discussed the importance of active participation, and honesty to the treatment process.  Encouraged the patient to discuss/vent their feelings, frustrations, and fears concerning their ongoing medical issues and validated their feelings.     Discussed the importance of finding enjoyable activities and coping skills that the patient can engage in a regular basis. Discussed healthy coping skills such as distraction, self love, grounding, thought challenges/reframing, etc.  Provided patient with list of healthy coping skills this date. Discussed the importance of medication compliance.  Praised the patient for seeking help and spent the majority of the session building rapport.       Allowed patient to freely discuss issues without interruption or judgment. Provided safe, confidential environment to facilitate the development of positive therapeutic relationship and encourage open, honest communication.      Therapist addressed discharge safety planning this date. Assisted patient in identifying risk factors which would indicate the need for higher level of care after discharge;   including thoughts to harm self or others and/or self-harming behavior. Encouraged patient to call 911, or present to the nearest emergency room should any of these events occur. Discussed crisis intervention services and means to access.  Encouraged securing any objects of harm.       Therapist completed integrated summary, treatment plan, and initiated social history this date.  Therapist is strongly encouraging family involvement in treatment.       ASSESSMENT: Wilfredo Lau is a 36 year old  male who is currently homeless. Patient states that he obtained his GED, and currently receives disability. Patient was discharged from the [employer] 10 years ago. Patient states that he moved to [place] from [place] with his girlfriend and her dad. Once they got down here they had a falling out because he wanted to stop drugs and they wanted to continue. He reports that they left him here and he has been staying at the [facility]. He reports that this is a major contributor to the SI he was experiencing which brought him to [facility]. Patient states that he has been drinking, and using heroin. He reports using meth recently but stopping because he did not like it. He reports that he wants to get better that way he can have a more hopeful future and be present for his eight year old son in the future. Patient was calm and cooperative with assessment.      PLAN:       Patient to remain hospitalized this date.     Treatment team will focus efforts on stabilizing patient's acute symptoms while providing education on healthy coping and crisis management to reduce hospitalizations.   Patient requires daily psychiatrist evaluation and 24/7 nursing supervision to promote patient  safety.     Therapist will offer 1-4 individual sessions, 1 therapy group daily, family education, and appropriate referral.    Therapist recommends inpatient rehab.

## 2022-05-13 NOTE — NURSING NOTE
Attempted to contact [facility] at this time r/t patient request to be transferred to their facility. No answer.
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
## 2022-05-13 NOTE — H&P
[facility] Medicine Services  History & Physical    Patient Identification:  Name:  Wilfredo Lau  Age:  36 y.o.  Sex:  male  :  1985  MRN:  [id number]   Visit Number:  [id number]  Admit Date: (Not on file)   Primary Care Physician:  Provider, No Known    Subjective     Chief complaint: abdominal pain    History of presenting illness:      Wilfredo Lau is a 36 y.o. male  who presented to the emergency department at [facility] on 2022 for voluntary alcohol withdrawal.  He was admitted  by Dr. Hernandez to the [facility] for depression and suicidal ideation. The patient is from [place]. He has only been in the area for approximately 1 week. He has a longstanding history of alcohol & substance abuse. He states that he drinks 1-2 pints of vodka daily. He last used illicit Fentynal approximately 3 months ago. He is aware that he has cirrhosis of the liver. After reviewing the medical records, the patient had a presentation at the ED on 2022 with intoxication, however the patient left AMA without being seen by a provider. At that time his ethanol level was elevated to 24 and his magnesium was 1.1    Medical services were consulted on admission to [facility] for pancytopenia and cirrohsis of the liver. On initial assessment, his WBC 1.72, platelets 46, hgb 12, magnesium 1.2, PT 21.1, INR 1.77.  His CRP 0.65. He had an abdominal ultrasound in the ED showed tiny ascites On exam he did complain of non-specific abdominal pain. He denied nausea, vomiting, diarrhea. KUB was obtained that was negative for acute processed. He was noted to have tremors and was being treated for acute alcohol withdraw by his primary team     I examined the patient earlier today who continued to complain of abdominal pain. Today he had significant tenderness to light palpation. He denied nausea, vomiting, diarrhea. A CT of the abdomen and pelvis without contrast was obtained. This had  "findings of probable upper abdominal varies, large ascitics, hyper densities at the level of the superior right femoral head that are worrisome for avascular necrosis. This finding was discussed with Dr. Behbahani who felt the patient is high risk for SBP and would likely need a paracentesis. The patient was transferred to PCU for close monitoring and continued treatment.       ---------------------------------------------------------------------------------------------------------------------   Review of Systems   Constitutional: Negative for activity change, appetite change, chills, diaphoresis, fatigue and fever.   Respiratory: Negative for chest tightness, shortness of breath and wheezing.    Cardiovascular: Negative for chest pain and leg swelling.   Gastrointestinal: Positive for abdominal pain. Negative for blood in stool, constipation, diarrhea, nausea and vomiting.        Lower abdominal pain, \"sharp\" in nature.    Genitourinary: Negative for difficulty urinating, dysuria and flank pain.   Neurological: Negative for dizziness,seizures, weakness and headaches.   Positive for tremors  Hematological: Negative for adenopathy. Does not bruise/bleed easily.   All other systems reviewed and are negative.  I have reviewed this documentation on admission and reflected findings on assessment earlier today.   ---------------------------------------------------------------------------------------------------------------------   Past Medical History:   Diagnosis Date   • Alcohol abuse    • Cirrhosis (HCC)    • HTN (hypertension)    • Liver disease    • Psychosis (HCC)    • Substance abuse (HCC)    • Withdrawal symptoms, alcohol (HCC)    • Withdrawal symptoms, drug or narcotic (HCC)      No past surgical history on file.  No family history on file.  Social History     Socioeconomic History   • Marital status: Single   Tobacco Use   • Smoking status: Current Every Day Smoker     Packs/day: 1.00     Types: Cigarettes   • " Smokeless tobacco: Never Used   Vaping Use   • Vaping Use: Never used   Substance and Sexual Activity   • Alcohol use: Yes     Alcohol/week: 30.0 standard drinks     Types: 30 Drinks containing 0.5 oz of alcohol per week     Comment: 2 to 3 pints a day   • Sexual activity: Not Currently     Partners: Female     Birth control/protection: None     ---------------------------------------------------------------------------------------------------------------------   Allergies:  Penicillins  ---------------------------------------------------------------------------------------------------------------------   Home medications:    Medications below are reported home medications pulling from within the system; at this time, these medications have not been reconciled unless otherwise specified and are in the verification process for further verifcation as current home medications.  No medications prior to admission.       Hospital Scheduled Meds:         Current listed hospital scheduled medications may not yet reflect those currently placed in orders that are signed and held awaiting patient's arrival to floor.   ---------------------------------------------------------------------------------------------------------------------     Objective     Vital Signs:  Temp:  [97.6 °F (36.4 °C)-98.9 °F (37.2 °C)] 98.9 °F (37.2 °C)  Heart Rate:  [] 114  Resp:  [18] 18  BP: (127-140)/(60-89) 133/89  There were no vitals filed for this visit.  There is no height or weight on file to calculate BMI.  ---------------------------------------------------------------------------------------------------------------------       Physical Exam    Vitals and nursing note reviewed.   Constitutional:       General: He is awake.   HENT:      Head: Normocephalic and atraumatic.   Cardiovascular:      Rate and Rhythm: Normal rate and regular rhythm.      Pulses: Normal pulses.      Heart sounds: Normal heart sounds, S1 normal and S2 normal.    Pulmonary:      Effort: Pulmonary effort is normal.      Breath sounds: Normal breath sounds.   Abdominal:      Tenderness: There is abdominal tenderness in the right lower quadrant and left lower quadrant.   Musculoskeletal:      Cervical back: Normal range of motion and neck supple.   Skin:     General: Skin is warm and dry.      Capillary Refill: Capillary refill takes less than 2 seconds.      Comments: Multiple tattoos and facial piecings   Neurological:      Mental Status: He is alert and oriented to person, place, and time.      Cranial Nerves: Cranial nerves are intact.   Notable tremors on exam   Psychiatric:         Attention and Perception: Attention normal.         Mood and Affect: Mood is depressed.         Behavior: Behavior is cooperative.     I have reviewed this documentation and have made the appropriate changes to reflect today's findings.   ---------------------------------------------------------------------------------------------------------------------    ---------------------------------------------------------------------------------------------------------------------   Results from last 7 days   Lab Units 05/13/22  0711 05/12/22  1048 05/12/22  0748 05/12/22  0650 05/08/22  0016   CRP mg/dL  --   --   --  0.65*  --    LACTATE mmol/L  --  1.3 2.1*  --   --    WBC 10*3/mm3 1.43*  --   --  1.72*  --    HEMOGLOBIN g/dL 10.0*  --   --  12.0*  --    HEMATOCRIT % 30.9*  --   --  36.6*  --    MCV fL 99.0*  --   --  98.7*  --    MCHC g/dL 32.4  --   --  32.8  --    PLATELETS 10*3/mm3 60*  --   --  46*  --    INR   --   --   --  1.77* 1.78*         Results from last 7 days   Lab Units 05/13/22  0711 05/12/22  0650 05/08/22  0016   SODIUM mmol/L 139 140 139   POTASSIUM mmol/L 4.2 3.1* 3.6   MAGNESIUM mg/dL 1.4* 1.2* 1.1*   CHLORIDE mmol/L 109* 103 103   CO2 mmol/L 24.1 22.6 23.7   BUN mg/dL 11 6 5*   CREATININE mg/dL 0.64* 0.74* 0.55*   CALCIUM mg/dL 8.2* 9.0 8.3*   GLUCOSE mg/dL 120* 101* 136*    ALBUMIN g/dL 2.48* 3.65 2.99*   BILIRUBIN mg/dL 5.1* 9.5* 5.2*   ALK PHOS U/L 89 116 87   AST (SGOT) U/L 64* 106* 92*   ALT (SGPT) U/L 34 52* 46*   Estimated Creatinine Clearance: 195.7 mL/min (A) (by C-G formula based on SCr of 0.64 mg/dL (L)).  Ammonia   Date Value Ref Range Status   05/12/2022 32 16 - 60 umol/L Final     Results from last 7 days   Lab Units 05/08/22  0016   TROPONIN T ng/mL <0.010         No results found for: HGBA1C  No results found for: TSH, FREET4  No results found for: PREGTESTUR, PREGSERUM, HCG, HCGQUANT  Pain Management Panel     Pain Management Panel Latest Ref Rng & Units 5/12/2022    AMPHETAMINES SCREEN, URINE Negative Negative    BARBITURATES SCREEN Negative Negative    BENZODIAZEPINE SCREEN, URINE Negative Positive(A)    BUPRENORPHINEUR Negative Negative    COCAINE SCREEN, URINE Negative Negative    METHADONE SCREEN, URINE Negative Negative    METHAMPHETAMINEUR Negative Negative        Blood Culture   Date Value Ref Range Status   05/12/2022 No growth at 24 hours  Preliminary   05/12/2022 No growth at 24 hours  Preliminary     No results found for: URINECX  No results found for: WOUNDCX  No results found for: STOOLCX      ---------------------------------------------------------------------------------------------------------------------  Imaging Results (Last 7 Days)     ** No results found for the last 168 hours. **          Cultures:  Blood Culture   Date Value Ref Range Status   05/12/2022 No growth at 24 hours  Preliminary   05/12/2022 No growth at 24 hours  Preliminary       Last echocardiogram: none on file at this time.       I have personally reviewed the above radiology images and read the final radiology report on 05/13/22  ---------------------------------------------------------------------------------------------------------------------  Assessment / Plan     Active Hospital Problems    Diagnosis  POA   • Hepatic encephalopathy (HCC) [K72.90]  Yes     -large volume  ascites, will likely benefit from paracentesis   -high risk for SBP  -Chronic cirrhosis of the liver 2/2 alcohol abuse  -pancytopenia  -elevated liver enzymes  -elevated bilirubin  -abnormal coagulation studies  · Results of CT in HPI, large volume ascites   · Patient high risk for SBP, will start 2gm rocephin daily  · 1.5mg/kg albumin on day 1 and 1mg/kg albumin on day 3 for ascities.  · Acute hepatitis/HIV negative  · Will add daily Protonix  · WBC continue to decrease, monitor this closely  · platelets increased to 60 day.   · Hgb 10, monitor closely for bleeding.  · Recheck lactate, CRP  · CBC in AM      -hypomagnesemia  -hypokalemia  · Will replace per protocol   · CMP in AM     -alcohol abuse (last drink just prior to ED presentation)  -history of substance abuse  -depression  -suicidal ideation  · CIWA protocol   · Continue high dose thiamine and 1mg folate.   · Continued telemetry monitoring  · Seizure precautions.      ----------  -DVT prophylaxis: Scds. Patient pancytopenic, will defer anticoagulation at this time  -Activity: as tolerated  -Expected length of stay: INPATIENT status due to the need for care which can only be reasonably provided in an hospital setting such as aggressive/expedited ancillary services and/or consultation services, the necessity for IV medications, close physician monitoring and/or the possible need for procedures.  In such, I feel patient’s risk for adverse outcomes and need for care warrant INPATIENT evaluation and predict the patient’s care encounter to likely last beyond 2 midnights.    -Disposition per hospital course    High risk secondary to ETOH abuse, substance abuse, cirrhosis, electrolyte imbalances      KEM Yu   05/13/22  19:17 EDT

## 2022-05-14 ENCOUNTER — APPOINTMENT (OUTPATIENT)
Dept: CARDIOLOGY | Facility: HOSPITAL | Age: 37
End: 2022-05-14

## 2022-05-14 ENCOUNTER — APPOINTMENT (OUTPATIENT)
Dept: GENERAL RADIOLOGY | Facility: HOSPITAL | Age: 37
End: 2022-05-14

## 2022-05-14 ENCOUNTER — APPOINTMENT (OUTPATIENT)
Dept: ULTRASOUND IMAGING | Facility: HOSPITAL | Age: 37
End: 2022-05-14

## 2022-05-14 LAB
ABO GROUP BLD: NORMAL
ABO GROUP BLD: NORMAL
ALBUMIN SERPL-MCNC: 2.86 G/DL (ref 3.5–5.2)
ALBUMIN/GLOB SERPL: 0.7 G/DL
ALP SERPL-CCNC: 107 U/L (ref 39–117)
ALT SERPL W P-5'-P-CCNC: 36 U/L (ref 1–41)
ANION GAP SERPL CALCULATED.3IONS-SCNC: 8.1 MMOL/L (ref 5–15)
AST SERPL-CCNC: 68 U/L (ref 1–40)
BASOPHILS # BLD AUTO: 0.01 10*3/MM3 (ref 0–0.2)
BASOPHILS NFR BLD AUTO: 0.7 % (ref 0–1.5)
BH CV ECHO MEAS - ACS: 2.7 CM
BH CV ECHO MEAS - AO MAX PG: 14.7 MMHG
BH CV ECHO MEAS - AO MEAN PG: 8 MMHG
BH CV ECHO MEAS - AO ROOT DIAM: 3.2 CM
BH CV ECHO MEAS - AO V2 MAX: 192 CM/SEC
BH CV ECHO MEAS - AO V2 VTI: 41 CM
BH CV ECHO MEAS - EDV(CUBED): 125 ML
BH CV ECHO MEAS - EDV(MOD-SP4): 108 ML
BH CV ECHO MEAS - EF(MOD-SP4): 53.7 %
BH CV ECHO MEAS - ESV(CUBED): 39.3 ML
BH CV ECHO MEAS - ESV(MOD-SP4): 50 ML
BH CV ECHO MEAS - FS: 32 %
BH CV ECHO MEAS - IVS/LVPW: 0.69 CM
BH CV ECHO MEAS - IVSD: 1.1 CM
BH CV ECHO MEAS - LA DIMENSION: 2.8 CM
BH CV ECHO MEAS - LAT PEAK E' VEL: 11.4 CM/SEC
BH CV ECHO MEAS - LV DIASTOLIC VOL/BSA (35-75): 47.5 CM2
BH CV ECHO MEAS - LV MASS(C)D: 276.4 GRAMS
BH CV ECHO MEAS - LV SYSTOLIC VOL/BSA (12-30): 22 CM2
BH CV ECHO MEAS - LVIDD: 5 CM
BH CV ECHO MEAS - LVIDS: 3.4 CM
BH CV ECHO MEAS - LVOT AREA: 3.1 CM2
BH CV ECHO MEAS - LVOT DIAM: 2 CM
BH CV ECHO MEAS - LVPWD: 1.6 CM
BH CV ECHO MEAS - MED PEAK E' VEL: 8.6 CM/SEC
BH CV ECHO MEAS - MV A MAX VEL: 131 CM/SEC
BH CV ECHO MEAS - MV E MAX VEL: 99.2 CM/SEC
BH CV ECHO MEAS - MV E/A: 0.76
BH CV ECHO MEAS - PA ACC TIME: 0.15 SEC
BH CV ECHO MEAS - PA PR(ACCEL): 12.4 MMHG
BH CV ECHO MEAS - RAP SYSTOLE: 10 MMHG
BH CV ECHO MEAS - RVSP: 34.6 MMHG
BH CV ECHO MEAS - SI(MOD-SP4): 25.5 ML/M2
BH CV ECHO MEAS - SV(MOD-SP4): 58 ML
BH CV ECHO MEAS - TAPSE (>1.6): 3.6 CM
BH CV ECHO MEAS - TR MAX PG: 24.6 MMHG
BH CV ECHO MEAS - TR MAX VEL: 248 CM/SEC
BH CV ECHO MEASUREMENTS AVERAGE E/E' RATIO: 9.92
BILIRUB SERPL-MCNC: 4.5 MG/DL (ref 0–1.2)
BLD GP AB SCN SERPL QL: NEGATIVE
BUN SERPL-MCNC: 8 MG/DL (ref 6–20)
BUN/CREAT SERPL: 12.3 (ref 7–25)
CALCIUM SPEC-SCNC: 8.4 MG/DL (ref 8.6–10.5)
CHLORIDE SERPL-SCNC: 105 MMOL/L (ref 98–107)
CO2 SERPL-SCNC: 23.9 MMOL/L (ref 22–29)
CREAT SERPL-MCNC: 0.65 MG/DL (ref 0.76–1.27)
CRP SERPL-MCNC: 0.36 MG/DL (ref 0–0.5)
D-LACTATE SERPL-SCNC: 1.6 MMOL/L (ref 0.5–2)
DEPRECATED RDW RBC AUTO: 61.8 FL (ref 37–54)
EGFRCR SERPLBLD CKD-EPI 2021: 126 ML/MIN/1.73
EOSINOPHIL # BLD AUTO: 0.11 10*3/MM3 (ref 0–0.4)
EOSINOPHIL NFR BLD AUTO: 7.6 % (ref 0.3–6.2)
ERYTHROCYTE [DISTWIDTH] IN BLOOD BY AUTOMATED COUNT: 17.1 % (ref 12.3–15.4)
GLOBULIN UR ELPH-MCNC: 3.8 GM/DL
GLUCOSE SERPL-MCNC: 135 MG/DL (ref 65–99)
HCT VFR BLD AUTO: 32.9 % (ref 37.5–51)
HGB BLD-MCNC: 10.7 G/DL (ref 13–17.7)
IMM GRANULOCYTES # BLD AUTO: 0.01 10*3/MM3 (ref 0–0.05)
IMM GRANULOCYTES NFR BLD AUTO: 0.7 % (ref 0–0.5)
INR PPP: 1.83 (ref 0.9–1.1)
LEFT ATRIUM VOLUME INDEX: 34.4 ML/M2
LYMPHOCYTES # BLD AUTO: 0.23 10*3/MM3 (ref 0.7–3.1)
LYMPHOCYTES NFR BLD AUTO: 16 % (ref 19.6–45.3)
MAGNESIUM SERPL-MCNC: 1.4 MG/DL (ref 1.6–2.6)
MAXIMAL PREDICTED HEART RATE: 185 BPM
MCH RBC QN AUTO: 32.2 PG (ref 26.6–33)
MCHC RBC AUTO-ENTMCNC: 32.5 G/DL (ref 31.5–35.7)
MCV RBC AUTO: 99.1 FL (ref 79–97)
MONOCYTES # BLD AUTO: 0.15 10*3/MM3 (ref 0.1–0.9)
MONOCYTES NFR BLD AUTO: 10.4 % (ref 5–12)
NEUTROPHILS NFR BLD AUTO: 0.93 10*3/MM3 (ref 1.7–7)
NEUTROPHILS NFR BLD AUTO: 64.6 % (ref 42.7–76)
NRBC BLD AUTO-RTO: 0 /100 WBC (ref 0–0.2)
PLATELET # BLD AUTO: 50 10*3/MM3 (ref 140–450)
PMV BLD AUTO: 9.9 FL (ref 6–12)
POTASSIUM SERPL-SCNC: 4.1 MMOL/L (ref 3.5–5.2)
PROT SERPL-MCNC: 6.7 G/DL (ref 6–8.5)
PROTHROMBIN TIME: 21.6 SECONDS (ref 12.1–14.7)
RBC # BLD AUTO: 3.32 10*6/MM3 (ref 4.14–5.8)
RH BLD: POSITIVE
RH BLD: POSITIVE
SODIUM SERPL-SCNC: 137 MMOL/L (ref 136–145)
STRESS TARGET HR: 157 BPM
T&S EXPIRATION DATE: NORMAL
WBC NRBC COR # BLD: 1.44 10*3/MM3 (ref 3.4–10.8)

## 2022-05-14 PROCEDURE — 99233 SBSQ HOSP IP/OBS HIGH 50: CPT | Performed by: INTERNAL MEDICINE

## 2022-05-14 PROCEDURE — 83735 ASSAY OF MAGNESIUM: CPT

## 2022-05-14 PROCEDURE — 86901 BLOOD TYPING SEROLOGIC RH(D): CPT | Performed by: INTERNAL MEDICINE

## 2022-05-14 PROCEDURE — 94799 UNLISTED PULMONARY SVC/PX: CPT

## 2022-05-14 PROCEDURE — 76705 ECHO EXAM OF ABDOMEN: CPT

## 2022-05-14 PROCEDURE — 85025 COMPLETE CBC W/AUTO DIFF WBC: CPT

## 2022-05-14 PROCEDURE — 86901 BLOOD TYPING SEROLOGIC RH(D): CPT

## 2022-05-14 PROCEDURE — 80053 COMPREHEN METABOLIC PANEL: CPT

## 2022-05-14 PROCEDURE — 86900 BLOOD TYPING SEROLOGIC ABO: CPT

## 2022-05-14 PROCEDURE — 86140 C-REACTIVE PROTEIN: CPT | Performed by: HOSPITALIST

## 2022-05-14 PROCEDURE — 86900 BLOOD TYPING SEROLOGIC ABO: CPT | Performed by: INTERNAL MEDICINE

## 2022-05-14 PROCEDURE — P9047 ALBUMIN (HUMAN), 25%, 50ML: HCPCS | Performed by: HOSPITALIST

## 2022-05-14 PROCEDURE — 25010000002 CEFTRIAXONE PER 250 MG

## 2022-05-14 PROCEDURE — 73502 X-RAY EXAM HIP UNI 2-3 VIEWS: CPT

## 2022-05-14 PROCEDURE — 85610 PROTHROMBIN TIME: CPT

## 2022-05-14 PROCEDURE — 25010000002 ALBUMIN HUMAN 25% PER 50 ML: Performed by: HOSPITALIST

## 2022-05-14 PROCEDURE — 93306 TTE W/DOPPLER COMPLETE: CPT

## 2022-05-14 PROCEDURE — 86850 RBC ANTIBODY SCREEN: CPT | Performed by: INTERNAL MEDICINE

## 2022-05-14 PROCEDURE — 83605 ASSAY OF LACTIC ACID: CPT

## 2022-05-14 PROCEDURE — 25010000002 MAGNESIUM SULFATE 2 GM/50ML SOLUTION

## 2022-05-14 PROCEDURE — 94762 N-INVAS EAR/PLS OXIMTRY CONT: CPT

## 2022-05-14 RX ORDER — SODIUM CHLORIDE 0.9 % (FLUSH) 0.9 %
10 SYRINGE (ML) INJECTION EVERY 12 HOURS SCHEDULED
Status: DISCONTINUED | OUTPATIENT
Start: 2022-05-14 | End: 2022-05-20 | Stop reason: HOSPADM

## 2022-05-14 RX ORDER — SODIUM CHLORIDE 0.9 % (FLUSH) 0.9 %
10 SYRINGE (ML) INJECTION AS NEEDED
Status: DISCONTINUED | OUTPATIENT
Start: 2022-05-14 | End: 2022-05-20 | Stop reason: HOSPADM

## 2022-05-14 RX ORDER — MORPHINE SULFATE 2 MG/ML
1 INJECTION, SOLUTION INTRAMUSCULAR; INTRAVENOUS ONCE
Status: DISCONTINUED | OUTPATIENT
Start: 2022-05-15 | End: 2022-05-14

## 2022-05-14 RX ORDER — MORPHINE SULFATE 2 MG/ML
1 INJECTION, SOLUTION INTRAMUSCULAR; INTRAVENOUS ONCE
Status: COMPLETED | OUTPATIENT
Start: 2022-05-15 | End: 2022-05-15

## 2022-05-14 RX ADMIN — LORAZEPAM 1.5 MG: 1 TABLET ORAL at 09:23

## 2022-05-14 RX ADMIN — Medication 10 ML: at 21:25

## 2022-05-14 RX ADMIN — ALBUMIN HUMAN 25 G: 0.25 SOLUTION INTRAVENOUS at 06:32

## 2022-05-14 RX ADMIN — LORAZEPAM 2 MG: 2 TABLET ORAL at 01:38

## 2022-05-14 RX ADMIN — ALBUMIN HUMAN 25 G: 0.25 SOLUTION INTRAVENOUS at 02:57

## 2022-05-14 RX ADMIN — MAGNESIUM SULFATE HEPTAHYDRATE 2 G: 40 INJECTION, SOLUTION INTRAVENOUS at 02:06

## 2022-05-14 RX ADMIN — Medication 10 ML: at 09:24

## 2022-05-14 RX ADMIN — SODIUM CHLORIDE 500 ML: 9 INJECTION, SOLUTION INTRAVENOUS at 01:36

## 2022-05-14 RX ADMIN — ALBUMIN HUMAN 25 G: 0.25 SOLUTION INTRAVENOUS at 03:27

## 2022-05-14 RX ADMIN — LORAZEPAM 1.5 MG: 1 TABLET ORAL at 21:24

## 2022-05-14 RX ADMIN — Medication 2 TABLET: at 09:23

## 2022-05-14 RX ADMIN — ALBUMIN HUMAN 25 G: 0.25 SOLUTION INTRAVENOUS at 09:24

## 2022-05-14 RX ADMIN — CEFTRIAXONE 2 G: 2 INJECTION, POWDER, FOR SOLUTION INTRAMUSCULAR; INTRAVENOUS at 23:42

## 2022-05-14 RX ADMIN — ALBUMIN HUMAN 25 G: 0.25 SOLUTION INTRAVENOUS at 05:39

## 2022-05-14 RX ADMIN — CEFTRIAXONE 2 G: 2 INJECTION, POWDER, FOR SOLUTION INTRAMUSCULAR; INTRAVENOUS at 01:37

## 2022-05-14 RX ADMIN — LORAZEPAM 1.5 MG: 1 TABLET ORAL at 15:41

## 2022-05-14 RX ADMIN — MAGNESIUM SULFATE HEPTAHYDRATE 2 G: 40 INJECTION, SOLUTION INTRAVENOUS at 15:41

## 2022-05-14 RX ADMIN — NICOTINE TRANSDERMAL SYSTEM 1 PATCH: 21 PATCH, EXTENDED RELEASE TRANSDERMAL at 09:23

## 2022-05-14 RX ADMIN — FOLIC ACID 1 MG: 1 TABLET ORAL at 09:23

## 2022-05-14 RX ADMIN — MAGNESIUM SULFATE HEPTAHYDRATE 2 G: 40 INJECTION, SOLUTION INTRAVENOUS at 11:53

## 2022-05-14 RX ADMIN — ALBUMIN HUMAN 25 G: 0.25 SOLUTION INTRAVENOUS at 04:22

## 2022-05-14 NOTE — PLAN OF CARE
Goal Outcome Evaluation:              Outcome Evaluation: LUPILLO ARREOLA protocol followed, treated with prn ativan. No complaints at this time, no signs of distress. Bed alarm set, call light within reach.

## 2022-05-14 NOTE — PLAN OF CARE
Goal Outcome Evaluation:  Plan of Care Reviewed With: patient        Progress: improving  Outcome Evaluation: Patient vital signs currently stable and he remains on RA and tolerating it well. He is able to answer all of his orientation questions; however, he does intermittently make illogical statements. Patient also stood up and had a bowel movement in the floor today. He also pulled out his IV today and currently has very poor IV access. He has denied any suicidal feelings during this shift. Otherwise, no significant changes noted.

## 2022-05-14 NOTE — PROGRESS NOTES
"Patient Identification:  Name:  Wilfredo Lau  Age:  36 y.o.  Sex:  male  :  1985  MRN:  [id number]  Visit Number:  [id number]  Primary Care Provider:  Provider, No Known    Length of stay:  1    Subjective/Interval History/Consultants/Procedures     Chief Complaint: No chief complaint on file.      Subjective/Interval History:    36 y.o. male who was admitted on 2022 with large volume ascites from the [facility] [place].      PMH is significant for Bipolar 1 disorder, PTSD, Alcohol use disorder with current alcohol withdraw, cirrhosis of the liver.   For complete admission information, please see history and physical.     Consultations:  1. Othopedic    Procedures/Scans:  1. Paracentesis (pending)    Today, the patient was resting in bed. He was A&O x3. He had visible shakes and was requesting his ativan at the time of my evaluation. He denies chest pain, shortness of breath, headache, nausea, vomiting, diarrhea. He endorses abdominal pain. The patient has notable distention toward the left side of his abdomen. He continues to be tender to palpation, equal on both sides.  His CT from 2022 with evidence of large volume ascites. I discussed the possible need for a paracentesis today and we was agreeable if it is needed. On admission, his magnesium remained low, this is now being replaced via IV.  His lactate was elevated, the patient was given a 500mL bolus. I will recheck his lactate this AM. There is concern for SBP, the patient was started on daily rocephin as well as albumin 1.5mg/kg on day 1 and 1mg./kg on day 3.  Dr. Rosen also noted a systolic murmur, and ordered and ECHO, this is pending at this time. After review of his MAR, he continues to receive Ativan regularly, last documented CIWA score was 4.      I also noted a tattoo on the patients left chest that states \"DNR\", I have discussed this with the patient, he states it was intended to be a \"joke\" when he got the tattoo,  the patient " wishes to be a FULL CODE.     Discussed with AM MEGAN Grimm with no acute events overnight. Room location at the time of evaluation was [facility].    ----------------------------------------------------------------------------------------------------------------------  Current Hospital Meds:  albumin human, 25 g, Intravenous, Q1H  [START ON 5/15/2022] albumin human, 25 g, Intravenous, Q1H  B-complex with vitamin C, 2 tablet, Oral, Daily  cefTRIAXone, 2 g, Intravenous, Q24H  folic acid, 1 mg, Oral, Daily  LORazepam, 1.5 mg, Oral, 3 times per day   Followed by  [START ON 5/15/2022] LORazepam, 1 mg, Oral, 3 times per day   Followed by  [START ON 5/16/2022] LORazepam, 0.5 mg, Oral, 3 times per day  nicotine, 1 patch, Transdermal, Q24H  sodium chloride, 10 mL, Intravenous, Q12H         ----------------------------------------------------------------------------------------------------------------------      Objective     Vital Signs:  Temp:  [97.6 °F (36.4 °C)-98.9 °F (37.2 °C)] 97.6 °F (36.4 °C)  Heart Rate:  [] 96  Resp:  [16-28] 28  BP: (126-150)/() 140/83      05/14/22  0033 05/14/22  0547 05/14/22  0659   Weight: 98.4 kg (217 lb) 98.4 kg (217 lb) 98.4 kg (217 lb)     Body mass index is 26.43 kg/m².    Intake/Output Summary (Last 24 hours) at 5/14/2022 0728  Last data filed at 5/14/2022 0547  Gross per 24 hour   Intake 858 ml   Output 1375 ml   Net -517 ml     No intake/output data recorded.  Diet Regular  ----------------------------------------------------------------------------------------------------------------------    Physical Exam  Vitals and nursing note reviewed.   Constitutional:       General: He is awake.   HENT:      Head: Normocephalic and atraumatic.      Mouth/Throat:      Lips: Pink.      Mouth: Mucous membranes are moist.   Eyes:      General: Lids are normal.   Cardiovascular:      Rate and Rhythm: Tachycardia present.      Pulses:           Dorsalis pedis pulses are 2+ on the right side and  2+ on the left side.        Posterior tibial pulses are 2+ on the right side and 2+ on the left side.      Heart sounds: Murmur heard.    Systolic murmur is present.  Pulmonary:      Effort: Pulmonary effort is normal.      Breath sounds: Normal breath sounds.   Abdominal:      General: Abdomen is protuberant. Bowel sounds are normal.      Palpations: Abdomen is soft.      Hernia: A hernia is present. Hernia is present in the umbilical area.      Comments: Noted distention, L>R   Musculoskeletal:      Right lower leg: No edema.      Left lower leg: No edema.   Neurological:      General: No focal deficit present.      Mental Status: He is alert and oriented to person, place, and time.      GCS: GCS eye subscore is 4. GCS verbal subscore is 5. GCS motor subscore is 6.      Cranial Nerves: Cranial nerves are intact.      Motor: Tremor present.   Psychiatric:         Attention and Perception: Attention normal.         Mood and Affect: Mood normal.         Speech: Speech normal.         Behavior: Behavior is cooperative.          ----------------------------------------------------------------------------------------------------------------------  Tele:        ----------------------------------------------------------------------------------------------------------------------  Results from last 7 days   Lab Units 05/08/22  0016   TROPONIN T ng/mL <0.010     Results from last 7 days   Lab Units 05/14/22  0025 05/13/22  1926 05/13/22  1627 05/13/22  0711 05/12/22  1048 05/12/22  0748 05/12/22  0650 05/08/22  0016   CRP mg/dL 0.36  --  0.42  --   --   --  0.65*  --    LACTATE mmol/L  --  2.8*  --   --  1.3 2.1*  --   --    WBC 10*3/mm3 1.44*  --   --  1.43*  --   --  1.72*  --    HEMOGLOBIN g/dL 10.7*  --   --  10.0*  --   --  12.0*  --    HEMATOCRIT % 32.9*  --   --  30.9*  --   --  36.6*  --    MCV fL 99.1*  --   --  99.0*  --   --  98.7*  --    MCHC g/dL 32.5  --   --  32.4  --   --  32.8  --    PLATELETS 10*3/mm3 50*   --   --  60*  --   --  46*  --    INR  1.83*  --   --   --   --   --  1.77* 1.78*         Results from last 7 days   Lab Units 05/14/22  0025 05/13/22  1926 05/13/22  0711 05/12/22  0650   SODIUM mmol/L 137  --  139 140   POTASSIUM mmol/L 4.1  --  4.2 3.1*   MAGNESIUM mg/dL  --  1.3* 1.4* 1.2*   CHLORIDE mmol/L 105  --  109* 103   CO2 mmol/L 23.9  --  24.1 22.6   BUN mg/dL 8  --  11 6   CREATININE mg/dL 0.65*  --  0.64* 0.74*   CALCIUM mg/dL 8.4*  --  8.2* 9.0   GLUCOSE mg/dL 135*  --  120* 101*   ALBUMIN g/dL 2.86*  --  2.48* 3.65   BILIRUBIN mg/dL 4.5*  --  5.1* 9.5*   ALK PHOS U/L 107  --  89 116   AST (SGOT) U/L 68*  --  64* 106*   ALT (SGPT) U/L 36  --  34 52*   Estimated Creatinine Clearance: 220.8 mL/min (A) (by C-G formula based on SCr of 0.65 mg/dL (L)).  Ammonia   Date Value Ref Range Status   05/12/2022 32 16 - 60 umol/L Final         Blood Culture   Date Value Ref Range Status   05/12/2022 No growth at 24 hours  Preliminary   05/12/2022 No growth at 24 hours  Preliminary     No results found for: URINECX  No results found for: WOUNDCX  No results found for: STOOLCX  ----------------------------------------------------------------------------------------------------------------------  Imaging Results (Last 24 Hours)     ** No results found for the last 24 hours. **        ----------------------------------------------------------------------------------------------------------------------   I have reviewed the above laboratory values for 05/14/22    Assessment/Plan     Active Hospital Problems    Diagnosis  POA   • Hepatic encephalopathy (HCC) [K72.90]  Yes       -sereve sepsis (WBC 1.44, HR >90 ,lactate 2.8)  -large volume ascites, will likely benefit from paracentesis   -high risk for SBP  -Chronic cirrhosis of the liver 2/2 alcohol abuse  -pancytopenia  -elevated liver enzymes  -elevated bilirubin  -abnormal coagulation studies  -possible upper abdominal varices on CT  Results of CT in HPI, large  volume ascites, will be evaluated for paracentesis today.  Patient high risk for SBP, 2mg rocephin daily   1.5mg/kg albumin on day 1(5/13/2022) and 1mg/kg albumin on day 3 for ascities.  Acute hepatitis/HIV negative  Will add daily Protonix  WBC continue to decrease, monitor this closely  Hgb 10.7, monitor closely for bleeding.  Rechecking lactate this morning  CRP normal.   PT/INR elevated but stable.   platelets 50 today, 1 unit of pheresed platelets.   Complete abdominal US with vascular   CBC in AM      -hypomagnesemia  -hypokalemia  Will replace per protocol   CMP in AM  Will check magnesium at 1400     -alcohol abuse (last drink just prior to ED presentation)  -history of substance abuse  -depression  -suicidal ideation  [facility] protocol, last documented score of 4. Continued to require regular ativan. He has notable shakes on exam but otherwise pleasant at this time  Continue high dose thiamine and 1mg folate.   Continued telemetry monitoring  Seizure precautions    -hypertension  Possibly related to ETOH withdraw  Blood pressure acceptable, will monitor them closely.     -questionable avascular necrosis of the right femoral head  orthopedic consult for evaluation      ----------  -DVT prophylaxis: Scds. Patient pancytopenic, will defer anticoagulation at this time  -Activity: as tolerated   -Disposition per hospital course     High risk secondary to ETOH abuse, substance abuse, cirrhosis, electrolyte imbalances     Code status: the patient is a FULL CODE    Maddi Kramer, KEM  05/14/22  07:28 EDT

## 2022-05-15 ENCOUNTER — APPOINTMENT (OUTPATIENT)
Dept: CT IMAGING | Facility: HOSPITAL | Age: 37
End: 2022-05-15

## 2022-05-15 LAB
ALBUMIN SERPL-MCNC: 3.41 G/DL (ref 3.5–5.2)
ALBUMIN/GLOB SERPL: 1 G/DL
ALP SERPL-CCNC: 106 U/L (ref 39–117)
ALT SERPL W P-5'-P-CCNC: 33 U/L (ref 1–41)
ANION GAP SERPL CALCULATED.3IONS-SCNC: 7.6 MMOL/L (ref 5–15)
AST SERPL-CCNC: 80 U/L (ref 1–40)
BASOPHILS # BLD AUTO: 0.01 10*3/MM3 (ref 0–0.2)
BASOPHILS # BLD AUTO: 0.02 10*3/MM3 (ref 0–0.2)
BASOPHILS NFR BLD AUTO: 0.4 % (ref 0–1.5)
BASOPHILS NFR BLD AUTO: 0.9 % (ref 0–1.5)
BILIRUB SERPL-MCNC: 3.9 MG/DL (ref 0–1.2)
BUN SERPL-MCNC: 6 MG/DL (ref 6–20)
BUN/CREAT SERPL: 10.7 (ref 7–25)
CALCIUM SPEC-SCNC: 8.7 MG/DL (ref 8.6–10.5)
CHLORIDE SERPL-SCNC: 104 MMOL/L (ref 98–107)
CO2 SERPL-SCNC: 23.4 MMOL/L (ref 22–29)
CREAT SERPL-MCNC: 0.56 MG/DL (ref 0.76–1.27)
DEPRECATED RDW RBC AUTO: 61.4 FL (ref 37–54)
DEPRECATED RDW RBC AUTO: 62 FL (ref 37–54)
EGFRCR SERPLBLD CKD-EPI 2021: 131.8 ML/MIN/1.73
EOSINOPHIL # BLD AUTO: 0.12 10*3/MM3 (ref 0–0.4)
EOSINOPHIL # BLD AUTO: 0.16 10*3/MM3 (ref 0–0.4)
EOSINOPHIL NFR BLD AUTO: 5.4 % (ref 0.3–6.2)
EOSINOPHIL NFR BLD AUTO: 7.1 % (ref 0.3–6.2)
ERYTHROCYTE [DISTWIDTH] IN BLOOD BY AUTOMATED COUNT: 17.2 % (ref 12.3–15.4)
ERYTHROCYTE [DISTWIDTH] IN BLOOD BY AUTOMATED COUNT: 17.2 % (ref 12.3–15.4)
GLOBULIN UR ELPH-MCNC: 3.5 GM/DL
GLUCOSE SERPL-MCNC: 122 MG/DL (ref 65–99)
HCT VFR BLD AUTO: 30.4 % (ref 37.5–51)
HCT VFR BLD AUTO: 31.2 % (ref 37.5–51)
HGB BLD-MCNC: 10.2 G/DL (ref 13–17.7)
HGB BLD-MCNC: 10.2 G/DL (ref 13–17.7)
IMM GRANULOCYTES # BLD AUTO: 0.01 10*3/MM3 (ref 0–0.05)
IMM GRANULOCYTES # BLD AUTO: 0.01 10*3/MM3 (ref 0–0.05)
IMM GRANULOCYTES NFR BLD AUTO: 0.4 % (ref 0–0.5)
IMM GRANULOCYTES NFR BLD AUTO: 0.4 % (ref 0–0.5)
LYMPHOCYTES # BLD AUTO: 0.27 10*3/MM3 (ref 0.7–3.1)
LYMPHOCYTES # BLD AUTO: 0.28 10*3/MM3 (ref 0.7–3.1)
LYMPHOCYTES NFR BLD AUTO: 12.1 % (ref 19.6–45.3)
LYMPHOCYTES NFR BLD AUTO: 12.4 % (ref 19.6–45.3)
MAGNESIUM SERPL-MCNC: 1.5 MG/DL (ref 1.6–2.6)
MCH RBC QN AUTO: 32.2 PG (ref 26.6–33)
MCH RBC QN AUTO: 32.9 PG (ref 26.6–33)
MCHC RBC AUTO-ENTMCNC: 32.7 G/DL (ref 31.5–35.7)
MCHC RBC AUTO-ENTMCNC: 33.6 G/DL (ref 31.5–35.7)
MCV RBC AUTO: 98.1 FL (ref 79–97)
MCV RBC AUTO: 98.4 FL (ref 79–97)
MONOCYTES # BLD AUTO: 0.27 10*3/MM3 (ref 0.1–0.9)
MONOCYTES # BLD AUTO: 0.31 10*3/MM3 (ref 0.1–0.9)
MONOCYTES NFR BLD AUTO: 12.1 % (ref 5–12)
MONOCYTES NFR BLD AUTO: 13.7 % (ref 5–12)
NEUTROPHILS NFR BLD AUTO: 1.48 10*3/MM3 (ref 1.7–7)
NEUTROPHILS NFR BLD AUTO: 1.56 10*3/MM3 (ref 1.7–7)
NEUTROPHILS NFR BLD AUTO: 65.5 % (ref 42.7–76)
NEUTROPHILS NFR BLD AUTO: 69.6 % (ref 42.7–76)
NRBC BLD AUTO-RTO: 0 /100 WBC (ref 0–0.2)
NRBC BLD AUTO-RTO: 0 /100 WBC (ref 0–0.2)
PLATELET # BLD AUTO: 59 10*3/MM3 (ref 140–450)
PLATELET # BLD AUTO: 62 10*3/MM3 (ref 140–450)
PMV BLD AUTO: 10.4 FL (ref 6–12)
PMV BLD AUTO: 9.8 FL (ref 6–12)
POTASSIUM SERPL-SCNC: 4 MMOL/L (ref 3.5–5.2)
PROT SERPL-MCNC: 6.9 G/DL (ref 6–8.5)
RBC # BLD AUTO: 3.1 10*6/MM3 (ref 4.14–5.8)
RBC # BLD AUTO: 3.17 10*6/MM3 (ref 4.14–5.8)
SODIUM SERPL-SCNC: 135 MMOL/L (ref 136–145)
WBC NRBC COR # BLD: 2.24 10*3/MM3 (ref 3.4–10.8)
WBC NRBC COR # BLD: 2.26 10*3/MM3 (ref 3.4–10.8)

## 2022-05-15 PROCEDURE — 99232 SBSQ HOSP IP/OBS MODERATE 35: CPT | Performed by: SURGERY

## 2022-05-15 PROCEDURE — 99232 SBSQ HOSP IP/OBS MODERATE 35: CPT | Performed by: HOSPITALIST

## 2022-05-15 PROCEDURE — 25010000002 HYDROMORPHONE PER 4 MG: Performed by: INTERNAL MEDICINE

## 2022-05-15 PROCEDURE — 25010000002 MORPHINE PER 10 MG: Performed by: HOSPITALIST

## 2022-05-15 PROCEDURE — 25010000002 CEFEPIME PER 500 MG: Performed by: INTERNAL MEDICINE

## 2022-05-15 PROCEDURE — 85025 COMPLETE CBC W/AUTO DIFF WBC: CPT

## 2022-05-15 PROCEDURE — 83735 ASSAY OF MAGNESIUM: CPT | Performed by: INTERNAL MEDICINE

## 2022-05-15 PROCEDURE — 80053 COMPREHEN METABOLIC PANEL: CPT

## 2022-05-15 PROCEDURE — 74176 CT ABD & PELVIS W/O CONTRAST: CPT

## 2022-05-15 PROCEDURE — 25010000002 VANCOMYCIN 5 G RECONSTITUTED SOLUTION: Performed by: INTERNAL MEDICINE

## 2022-05-15 PROCEDURE — 25010000002 ALBUMIN HUMAN 25% PER 50 ML: Performed by: HOSPITALIST

## 2022-05-15 PROCEDURE — 25010000002 MAGNESIUM SULFATE 2 GM/50ML SOLUTION: Performed by: INTERNAL MEDICINE

## 2022-05-15 PROCEDURE — P9047 ALBUMIN (HUMAN), 25%, 50ML: HCPCS | Performed by: HOSPITALIST

## 2022-05-15 RX ORDER — POLYETHYLENE GLYCOL 3350 17 G/17G
17 POWDER, FOR SOLUTION ORAL DAILY
Status: DISCONTINUED | OUTPATIENT
Start: 2022-05-15 | End: 2022-05-17

## 2022-05-15 RX ORDER — HYDROMORPHONE HYDROCHLORIDE 1 MG/ML
0.5 INJECTION, SOLUTION INTRAMUSCULAR; INTRAVENOUS; SUBCUTANEOUS ONCE
Status: COMPLETED | OUTPATIENT
Start: 2022-05-15 | End: 2022-05-15

## 2022-05-15 RX ORDER — MAGNESIUM SULFATE HEPTAHYDRATE 40 MG/ML
2 INJECTION, SOLUTION INTRAVENOUS
Status: COMPLETED | OUTPATIENT
Start: 2022-05-15 | End: 2022-05-15

## 2022-05-15 RX ORDER — HYDROMORPHONE HYDROCHLORIDE 1 MG/ML
0.5 INJECTION, SOLUTION INTRAMUSCULAR; INTRAVENOUS; SUBCUTANEOUS EVERY 4 HOURS PRN
Status: DISCONTINUED | OUTPATIENT
Start: 2022-05-15 | End: 2022-05-17

## 2022-05-15 RX ORDER — DOCUSATE SODIUM 100 MG/1
100 CAPSULE, LIQUID FILLED ORAL 2 TIMES DAILY
Status: DISCONTINUED | OUTPATIENT
Start: 2022-05-15 | End: 2022-05-17

## 2022-05-15 RX ADMIN — FOLIC ACID 1 MG: 1 TABLET ORAL at 08:35

## 2022-05-15 RX ADMIN — MAGNESIUM SULFATE HEPTAHYDRATE 2 G: 40 INJECTION, SOLUTION INTRAVENOUS at 05:16

## 2022-05-15 RX ADMIN — VANCOMYCIN HYDROCHLORIDE 2000 MG: 5 INJECTION, POWDER, LYOPHILIZED, FOR SOLUTION INTRAVENOUS at 20:57

## 2022-05-15 RX ADMIN — Medication 10 ML: at 08:35

## 2022-05-15 RX ADMIN — MAGNESIUM SULFATE HEPTAHYDRATE 2 G: 40 INJECTION, SOLUTION INTRAVENOUS at 08:35

## 2022-05-15 RX ADMIN — ALBUMIN HUMAN 25 G: 0.25 SOLUTION INTRAVENOUS at 22:05

## 2022-05-15 RX ADMIN — Medication 10 ML: at 20:59

## 2022-05-15 RX ADMIN — HYDROMORPHONE HYDROCHLORIDE 0.5 MG: 1 INJECTION, SOLUTION INTRAMUSCULAR; INTRAVENOUS; SUBCUTANEOUS at 16:09

## 2022-05-15 RX ADMIN — HYDROMORPHONE HYDROCHLORIDE 0.5 MG: 1 INJECTION, SOLUTION INTRAMUSCULAR; INTRAVENOUS; SUBCUTANEOUS at 20:57

## 2022-05-15 RX ADMIN — HYDROMORPHONE HYDROCHLORIDE 0.5 MG: 1 INJECTION, SOLUTION INTRAMUSCULAR; INTRAVENOUS; SUBCUTANEOUS at 08:45

## 2022-05-15 RX ADMIN — ALBUMIN HUMAN 25 G: 0.25 SOLUTION INTRAVENOUS at 18:37

## 2022-05-15 RX ADMIN — MAGNESIUM SULFATE HEPTAHYDRATE 2 G: 40 INJECTION, SOLUTION INTRAVENOUS at 06:55

## 2022-05-15 RX ADMIN — LORAZEPAM 1 MG: 1 TABLET ORAL at 08:35

## 2022-05-15 RX ADMIN — ALBUMIN HUMAN 25 G: 0.25 SOLUTION INTRAVENOUS at 23:04

## 2022-05-15 RX ADMIN — LORAZEPAM 1 MG: 1 TABLET ORAL at 14:33

## 2022-05-15 RX ADMIN — ALBUMIN HUMAN 25 G: 0.25 SOLUTION INTRAVENOUS at 20:56

## 2022-05-15 RX ADMIN — LORAZEPAM 1.5 MG: 1 TABLET ORAL at 02:11

## 2022-05-15 RX ADMIN — Medication 100 MG: at 08:35

## 2022-05-15 RX ADMIN — NICOTINE TRANSDERMAL SYSTEM 1 PATCH: 21 PATCH, EXTENDED RELEASE TRANSDERMAL at 08:35

## 2022-05-15 RX ADMIN — CEFEPIME HYDROCHLORIDE 2 G: 2 INJECTION, POWDER, FOR SOLUTION INTRAVENOUS at 23:09

## 2022-05-15 RX ADMIN — MORPHINE SULFATE 1 MG: 2 INJECTION, SOLUTION INTRAMUSCULAR; INTRAVENOUS at 00:45

## 2022-05-15 RX ADMIN — Medication 2 TABLET: at 08:35

## 2022-05-15 RX ADMIN — CEFEPIME HYDROCHLORIDE 2 G: 2 INJECTION, POWDER, FOR SOLUTION INTRAVENOUS at 16:09

## 2022-05-15 RX ADMIN — LORAZEPAM 1 MG: 1 TABLET ORAL at 22:05

## 2022-05-15 NOTE — PLAN OF CARE
Goal Outcome Evaluation:           Outcome Evaluation: LUPILLO ARREOLA protocol followed, treated with prn ativan. Complaints of abdominal pain, one time dose of morphine ordered. No signs of distress at this time. Bed alarm set, call light within reach.

## 2022-05-15 NOTE — PLAN OF CARE
Goal Outcome Evaluation:  Plan of Care Reviewed With: patient        Progress: improving  Outcome Evaluation: Patient vital signs currently stable and he remains on RA. He is alert and oriented x4. Magnesium replaced today. Currently awaiting a CT of his abdomen. He has had several complaints of lower abdominal pain today. Otherwise, no significant changes noted.

## 2022-05-15 NOTE — PROGRESS NOTES
Patient Identification:  Name:  Wilfredo Lau  Age:  36 y.o.  Sex:  male  :  1985  MRN:  [id number]  Visit Number:  [id number]  Primary Care Provider:  Provider, No Known    Length of stay:  2    Subjective/Interval History/Consultants/Procedures     Chief Complaint: abdominal pain.         Subjective/Interval History:    36 y.o. male who was admitted on 2022 with large volume ascites from the [facility].       PMH is significant for Bipolar 1 disorder, PTSD, Alcohol use disorder with current alcohol withdraw, cirrhosis of the liver.   For complete admission information, please see history and physical.      Consultations:  1. Othopedic     Procedures/Scans:     Today, the patient was resting in bed. He was A&O x3. He had visible shakes and was requesting his ativan at the time of my evaluation. He denies chest pain, shortness of breath, headache, nausea, vomiting, diarrhea. He endorses abdominal pain. The patient has notable distention toward the left side of his abdomen. He continues to be tender to palpation, equal on both sides.  His CT from 2022 with evidence of large volume ascites. I discussed the possible need for a paracentesis today and we was agreeable if it is needed. On admission, his magnesium remained low, this is now being replaced via IV.  His lactate was elevated, the patient was given a 500mL bolus. I will recheck his lactate this AM. There is concern for SBP, the patient was started on daily rocephin as well as albumin 1.5mg/kg on day 1 and 1mg./kg on day 3.  Dr. Rosen also noted a systolic murmur, and ordered and ECHO, this is pending at this time. After review of his MAR, he continues to receive Ativan regularly. He has been overall A&O but does make illogical statements and has had a bowel movement in the floor as well has pull out IV's. The patient was discovered to have DNR tattoo'd on his chest, this was dicussed with patient and he wishes to be a FULL CODE.  He  was evaluated for the need of a paracentesis, his ascites was mainly perihepatis with a small LLQ, paracentesis was deferred at that time with close monitoring.  He has remained pancytopenic without s/sx of bleeding.  There was evidence of AVN of the R hip noted on a CT, ortho was consulted, patient can follow up on an out patient basis.       Today, the patient was laying in bed eating his breakfast. He continues to complain of abdominal pain, rating it a 10/10 and requesting medication for pain control. During movement he is noted to have discomfort with grunting. He as a more pronounced umbilical hernia today. He remains tender to light palpation. He denies chest pain, shortness of breath, nausea, vomiting, diarrhea. I have discussed this case in detail with Dr. Lazaro. He will re-evaluate the patient for paracentesis today. If he is unable to do a paracentesis, we will expand his antibiotic therapy. His WBC is 2.24 today, continued pancytopenia. He is high risk for SBP.        ----------------------------------------------------------------------------------------------------------------------  Current Hospital Meds:  albumin human, 25 g, Intravenous, Q1H  B-complex with vitamin C, 2 tablet, Oral, Daily  cefTRIAXone, 2 g, Intravenous, Q24H  folic acid, 1 mg, Oral, Daily  LORazepam, 1 mg, Oral, 3 times per day   Followed by  [START ON 5/16/2022] LORazepam, 0.5 mg, Oral, 3 times per day  magnesium sulfate, 2 g, Intravenous, Q2H  nicotine, 1 patch, Transdermal, Q24H  sodium chloride, 10 mL, Intravenous, Q12H  thiamine, 100 mg, Oral, Daily      ----------------------------------------------------------------------------------------------------------------------      Objective     Vital Signs:  Temp:  [98.3 °F (36.8 °C)-98.6 °F (37 °C)] 98.3 °F (36.8 °C)  Heart Rate:  [] 98  Resp:  [16-26] 26  BP: (135-168)/() 140/78      05/14/22  0547 05/14/22  0659 05/15/22  0438   Weight: 98.4 kg (217 lb) 98.4 kg (217  lb) 99.2 kg (218 lb 9.6 oz)     Body mass index is 26.62 kg/m².    Intake/Output Summary (Last 24 hours) at 5/15/2022 0718  Last data filed at 5/15/2022 0529  Gross per 24 hour   Intake 1087.17 ml   Output 3200 ml   Net -2112.83 ml     No intake/output data recorded.  Diet Regular  ----------------------------------------------------------------------------------------------------------------------    Physical Exam  Vitals and nursing note reviewed.   Constitutional:       General: He is awake.   HENT:      Head: Normocephalic and atraumatic.   Eyes:      General: Lids are normal.   Cardiovascular:      Rate and Rhythm: Regular rhythm. Tachycardia present.      Heart sounds: Murmur heard.    Systolic murmur is present.  Pulmonary:      Effort: Pulmonary effort is normal.      Breath sounds: Normal breath sounds.   Abdominal:      General: Abdomen is flat.      Palpations: Abdomen is soft.      Tenderness: There is abdominal tenderness in the periumbilical area.      Hernia: A hernia is present. Hernia is present in the umbilical area.   Feet:      Right foot:      Skin integrity: Skin integrity normal.      Left foot:      Skin integrity: Skin integrity normal.   Skin:     General: Skin is warm and dry.      Capillary Refill: Capillary refill takes less than 2 seconds.   Neurological:      General: No focal deficit present.      Mental Status: He is alert and oriented to person, place, and time.   Psychiatric:         Behavior: Behavior is cooperative.                ----------------------------------------------------------------------------------------------------------------------  Tele:      ----------------------------------------------------------------------------------------------------------------------      Results from last 7 days   Lab Units 05/14/22  0825 05/14/22  0025 05/13/22  1926 05/13/22  1627 05/13/22  0711 05/12/22  1048 05/12/22  0748 05/12/22  0650   CRP mg/dL  --  0.36  --  0.42  --   --   --   0.65*   LACTATE mmol/L 1.6  --  2.8*  --   --  1.3   < >  --    WBC 10*3/mm3  --  1.44*  --   --  1.43*  --   --  1.72*   HEMOGLOBIN g/dL  --  10.7*  --   --  10.0*  --   --  12.0*   HEMATOCRIT %  --  32.9*  --   --  30.9*  --   --  36.6*   MCV fL  --  99.1*  --   --  99.0*  --   --  98.7*   MCHC g/dL  --  32.5  --   --  32.4  --   --  32.8   PLATELETS 10*3/mm3  --  50*  --   --  60*  --   --  46*   INR   --  1.83*  --   --   --   --   --  1.77*    < > = values in this interval not displayed.         Results from last 7 days   Lab Units 05/15/22  0139 05/14/22  1359 05/14/22  0025 05/13/22  1926 05/13/22  0711   SODIUM mmol/L 135*  --  137  --  139   POTASSIUM mmol/L 4.0  --  4.1  --  4.2   MAGNESIUM mg/dL 1.5* 1.4*  --  1.3* 1.4*   CHLORIDE mmol/L 104  --  105  --  109*   CO2 mmol/L 23.4  --  23.9  --  24.1   BUN mg/dL 6  --  8  --  11   CREATININE mg/dL 0.56*  --  0.65*  --  0.64*   CALCIUM mg/dL 8.7  --  8.4*  --  8.2*   GLUCOSE mg/dL 122*  --  135*  --  120*   ALBUMIN g/dL 3.41*  --  2.86*  --  2.48*   BILIRUBIN mg/dL 3.9*  --  4.5*  --  5.1*   ALK PHOS U/L 106  --  107  --  89   AST (SGOT) U/L 80*  --  68*  --  64*   ALT (SGPT) U/L 33  --  36  --  34   Estimated Creatinine Clearance: 258.3 mL/min (A) (by C-G formula based on SCr of 0.56 mg/dL (L)).  No results found for: AMMONIA      Blood Culture   Date Value Ref Range Status   05/12/2022 No growth at 2 days  Preliminary   05/12/2022 No growth at 2 days  Preliminary     No results found for: URINECX  No results found for: WOUNDCX  No results found for: STOOLCX  ----------------------------------------------------------------------------------------------------------------------  Imaging Results (Last 24 Hours)     Procedure Component Value Units Date/Time    XR Hip With or Without Pelvis 2 - 3 View Right [[id number]] Collected: 05/14/22 1040     Updated: 05/14/22 1042    Narrative:      CR Hip Uni Comp Min 2 Vws left    INDICATION:   Acute left hip pain with  concern for avascular necrosis    COMPARISON:   None.    FINDINGS:  AP pelvis, AP left hip, and frog-leg lateral view(s) of the left hip.  No fracture or dislocation. No bone erosion or destruction.        Impression:      Negative examination. No radiographic evidence of avascular necrosis.    Signer Name: Toni Oliver MD   Signed: 5/14/2022 10:40 AM   Workstation Name: [id number]    [facility] Liver [[id number]] Collected: 05/14/22 1020     Updated: 05/14/22 1022    Narrative:      US Liver Or Hepatic    INDICATION:   Portal vein thrombosis. Cirrhosis with ascites and splenomegaly.    COMPARISON:   5/12/2022    FINDINGS:  No changes are seen since the recent abdominal ultrasound. Again noted is a cirrhotic architecture to the liver with ascites. Color flow imaging and Doppler spectral waveform analysis show patency of the portal vein with flow toward the liver. No hepatic  vein obstructions are seen. Splenomegaly is again noted.      Impression:      No evidence of portal vein thrombosis    Signer Name: Toni Oliver MD   Signed: 5/14/2022 10:20 AM   Workstation Name: [id number]    [facility]        ----------------------------------------------------------------------------------------------------------------------   I have reviewed the above laboratory values for 05/15/22    Assessment/Plan     Active Hospital Problems    Diagnosis  POA   • Hepatic encephalopathy (HCC) [K72.90]  Yes         -sereve sepsis (WBC 1.44, HR >90 ,lactate 2.8)  -large volume ascites, will likely benefit from paracentesis   -high risk for SBP  -Chronic cirrhosis of the liver 2/2 alcohol abuse  -pancytopenia  -elevated liver enzymes  -elevated bilirubin  -abnormal coagulation studies  -possible upper abdominal varices on CT  Results of CT in HPI, large volume ascites. Will re-evaluated for paracentesis today. Send of abdominal fluid for culture if obtained.    Patient high risk  for SBP, 2mg rocephin daily   1.5mg/kg albumin on day 1(5/13/2022) and 1mg/kg albumin on day 3 for ascities.  Acute hepatitis/HIV negative  Will add daily Protonix  WBC 2.24 today.   CRP normal.   PT/INR elevated but stable.   No thrombus on Liver US.   platelets 59 today. Not s/sx of bleeding  CBC in AM   Increased umbilical hernia, pain control per Dr. Lazaro.   If unable to do paracentesis, will expand antimicrobial coverage.      -hypomagnesemia  -hypokalemia  Will replace per protocol   CMP in AM  Magnesium 1.5 today, replace per protocol.      -alcohol abuse (last drink just prior to ED presentation)  -history of substance abuse  -depression  -suicidal ideation  [facility] protocol, last documented score of 5. Continued to require regular ativan. Visible shakes on exam  100mg  thiamine and 1mg folate.   Continued telemetry monitoring  Seizure precautions     -hypertension  Possibly related to ETOH withdraw  Blood pressure acceptable   ECHO with mitral valve regurgitation and tricuspid regurgitation. EF 56-60%    -questionable avascular necrosis of the right femoral head  orthopedic consult for evaluation   Follow up outpatient      ----------  -DVT prophylaxis: Scds. Patient pancytopenic, will defer anticoagulation at this time  -Activity: as tolerated   -Disposition per hospital course     High risk secondary to ETOH abuse, substance abuse, cirrhosis, electrolyte imbalances     Code status: the patient is a FULL CODE     KEM Yu  05/14/22  07:28 EDT      KEM Yu  05/15/22  07:18 EDT

## 2022-05-15 NOTE — CONSULTS
[facility]   Consult Note    Patient Name: Wilfredo Lau  : 1985  MRN: [id number]  Primary Care Physician:  Provider, No Known  Referring Physician: Morgan Rosen MD  Date of admission: 2022    Consults  Subjective   Subjective     Reason for Consult/ Chief Complaint: Lower abdominal pain    History of Present Illness  Wilfredo Lau is a 36 y.o. male with known cirrhosis secondary to alcohol abuse.  He has mild ascites and worsening abdominal pain concerning for SBP.  No free air or free fluid.  He has lower abdominal pain but no upper abdominal pain.  He reports constipation.  He has thrombocytopenia and elevated LFTs with a T bili of 3.9 and an elevated INR.    Review of Systems   Constitutional: Negative for activity change, appetite change, chills and fever.   HENT: Negative for sore throat and trouble swallowing.    Eyes: Negative for visual disturbance.   Respiratory: Negative for cough and shortness of breath.    Cardiovascular: Negative for chest pain and palpitations.   Gastrointestinal: Positive for abdominal pain and constipation. Negative for abdominal distention, blood in stool, diarrhea, nausea and vomiting.   Endocrine: Negative for cold intolerance and heat intolerance.   Genitourinary: Negative for dysuria.   Musculoskeletal: Negative for joint swelling.   Skin: Negative for color change, rash and wound.   Allergic/Immunologic: Negative for immunocompromised state.   Neurological: Negative for dizziness, seizures, weakness and headaches.   Hematological: Negative for adenopathy. Does not bruise/bleed easily.   Psychiatric/Behavioral: Negative for agitation and confusion.        Personal History     Past Medical History:   Diagnosis Date   • Alcohol abuse    • Cirrhosis (HCC)    • HTN (hypertension)    • Liver disease    • Psychosis (HCC)    • Substance abuse (HCC)    • Withdrawal symptoms, alcohol (HCC)    • Withdrawal symptoms, drug or narcotic (HCC)        No past  surgical history on file.    Family History: family history is not on file. Otherwise pertinent FHx was reviewed and not pertinent to current issue.    Social History:  reports that he has been smoking cigarettes. He has been smoking about 1.00 pack per day. He has never used smokeless tobacco. He reports current alcohol use of about 30.0 standard drinks of alcohol per week.    Home Medications:        Allergies:  Allergies   Allergen Reactions   • Penicillins Unknown - Low Severity       Objective    Objective     Vitals:  Temp:  [98.3 °F (36.8 °C)-98.4 °F (36.9 °C)] 98.4 °F (36.9 °C)  Heart Rate:  [] 98  Resp:  [12-22] 20  BP: (128-168)/() 165/109    Physical Exam  Constitutional:       Appearance: He is well-developed.   HENT:      Head: Normocephalic and atraumatic.   Eyes:      Conjunctiva/sclera: Conjunctivae normal.      Pupils: Pupils are equal, round, and reactive to light.   Neck:      Thyroid: No thyromegaly.      Vascular: No JVD.      Trachea: No tracheal deviation.   Cardiovascular:      Rate and Rhythm: Normal rate and regular rhythm.      Heart sounds: No murmur heard.    No friction rub. No gallop.   Pulmonary:      Effort: Pulmonary effort is normal.      Breath sounds: Normal breath sounds.   Abdominal:      General: There is no distension.      Palpations: Abdomen is soft. There is no hepatomegaly or splenomegaly.      Tenderness: There is abdominal tenderness in the right lower quadrant, suprapubic area and left lower quadrant. There is no guarding or rebound.      Hernia: A hernia is present. Hernia is present in the umbilical area.      Comments: Ascites filled umbilical hernia       Musculoskeletal:         General: No deformity. Normal range of motion.      Cervical back: Neck supple.   Skin:     General: Skin is warm and dry.   Neurological:      Mental Status: He is alert and oriented to person, place, and time.         Result Review    Result Review:  I have personally  reviewed the results from the time of this admission to 5/15/2022 19:45 EDT and agree with these findings:  [x]  Laboratory  []  Microbiology  [x]  Radiology  []  EKG/Telemetry   []  Cardiology/Vascular   []  Pathology  []  Old records  []  Other:      Assessment & Plan   Assessment / Plan     Brief Patient Summary:  Wilfredo Lau is a 36 y.o. male who presents with cirrhosis secondary to alcohol abuse with resulting elevated INR and thrombocytopenia.  He has worsening abdominal pain which may represent SBP but given the lower abdominal pain only suspect possible constipation or other source.    Active Hospital Problems:  Active Hospital Problems    Diagnosis    • Hepatic encephalopathy (HCC)      Plan:   Bowel regimen  Continue antibiotics  Clear liquid diet only  Will continue to follow    Pool Acosta MD

## 2022-05-15 NOTE — PROGRESS NOTES
Bedside ultrasound without significant ultrasound to tap, patient having significantly increased abdominal pain, have broadened out antibiotics to Cefepime, added IV Dilaudid as needed for pain control, repeat STAT CT Abdomen/Pelvis now.

## 2022-05-16 ENCOUNTER — APPOINTMENT (OUTPATIENT)
Dept: MRI IMAGING | Facility: HOSPITAL | Age: 37
End: 2022-05-16

## 2022-05-16 LAB
ALBUMIN SERPL-MCNC: 4.09 G/DL (ref 3.5–5.2)
ALBUMIN/GLOB SERPL: 1.4 G/DL
ALP SERPL-CCNC: 91 U/L (ref 39–117)
ALT SERPL W P-5'-P-CCNC: 31 U/L (ref 1–41)
ANION GAP SERPL CALCULATED.3IONS-SCNC: 8.8 MMOL/L (ref 5–15)
AST SERPL-CCNC: 73 U/L (ref 1–40)
BASOPHILS # BLD AUTO: 0.01 10*3/MM3 (ref 0–0.2)
BASOPHILS NFR BLD AUTO: 0.5 % (ref 0–1.5)
BILIRUB SERPL-MCNC: 4.1 MG/DL (ref 0–1.2)
BUN SERPL-MCNC: 6 MG/DL (ref 6–20)
BUN/CREAT SERPL: 10.2 (ref 7–25)
CALCIUM SPEC-SCNC: 8.9 MG/DL (ref 8.6–10.5)
CHLORIDE SERPL-SCNC: 104 MMOL/L (ref 98–107)
CO2 SERPL-SCNC: 24.2 MMOL/L (ref 22–29)
CREAT SERPL-MCNC: 0.59 MG/DL (ref 0.76–1.27)
DEPRECATED RDW RBC AUTO: 63.4 FL (ref 37–54)
EGFRCR SERPLBLD CKD-EPI 2021: 129.8 ML/MIN/1.73
EOSINOPHIL # BLD AUTO: 0.16 10*3/MM3 (ref 0–0.4)
EOSINOPHIL NFR BLD AUTO: 8.6 % (ref 0.3–6.2)
ERYTHROCYTE [DISTWIDTH] IN BLOOD BY AUTOMATED COUNT: 17.4 % (ref 12.3–15.4)
GLOBULIN UR ELPH-MCNC: 2.9 GM/DL
GLUCOSE SERPL-MCNC: 107 MG/DL (ref 65–99)
HCT VFR BLD AUTO: 30.9 % (ref 37.5–51)
HGB BLD-MCNC: 10.1 G/DL (ref 13–17.7)
IMM GRANULOCYTES # BLD AUTO: 0.01 10*3/MM3 (ref 0–0.05)
IMM GRANULOCYTES NFR BLD AUTO: 0.5 % (ref 0–0.5)
LYMPHOCYTES # BLD AUTO: 0.23 10*3/MM3 (ref 0.7–3.1)
LYMPHOCYTES NFR BLD AUTO: 12.3 % (ref 19.6–45.3)
MAGNESIUM SERPL-MCNC: 1.6 MG/DL (ref 1.6–2.6)
MCH RBC QN AUTO: 32.3 PG (ref 26.6–33)
MCHC RBC AUTO-ENTMCNC: 32.7 G/DL (ref 31.5–35.7)
MCV RBC AUTO: 98.7 FL (ref 79–97)
MONOCYTES # BLD AUTO: 0.25 10*3/MM3 (ref 0.1–0.9)
MONOCYTES NFR BLD AUTO: 13.4 % (ref 5–12)
NEUTROPHILS NFR BLD AUTO: 1.21 10*3/MM3 (ref 1.7–7)
NEUTROPHILS NFR BLD AUTO: 64.7 % (ref 42.7–76)
NRBC BLD AUTO-RTO: 0 /100 WBC (ref 0–0.2)
PLATELET # BLD AUTO: 59 10*3/MM3 (ref 140–450)
PMV BLD AUTO: 10.2 FL (ref 6–12)
POTASSIUM SERPL-SCNC: 4 MMOL/L (ref 3.5–5.2)
PROT SERPL-MCNC: 7 G/DL (ref 6–8.5)
RBC # BLD AUTO: 3.13 10*6/MM3 (ref 4.14–5.8)
SODIUM SERPL-SCNC: 137 MMOL/L (ref 136–145)
WBC NRBC COR # BLD: 1.87 10*3/MM3 (ref 3.4–10.8)

## 2022-05-16 PROCEDURE — 99232 SBSQ HOSP IP/OBS MODERATE 35: CPT | Performed by: HOSPITALIST

## 2022-05-16 PROCEDURE — 25010000002 HYDROMORPHONE PER 4 MG: Performed by: INTERNAL MEDICINE

## 2022-05-16 PROCEDURE — 99233 SBSQ HOSP IP/OBS HIGH 50: CPT | Performed by: INTERNAL MEDICINE

## 2022-05-16 PROCEDURE — 80053 COMPREHEN METABOLIC PANEL: CPT

## 2022-05-16 PROCEDURE — 36410 VNPNXR 3YR/> PHY/QHP DX/THER: CPT

## 2022-05-16 PROCEDURE — 94761 N-INVAS EAR/PLS OXIMETRY MLT: CPT

## 2022-05-16 PROCEDURE — C1751 CATH, INF, PER/CENT/MIDLINE: HCPCS

## 2022-05-16 PROCEDURE — 25010000002 CEFEPIME PER 500 MG: Performed by: INTERNAL MEDICINE

## 2022-05-16 PROCEDURE — 85025 COMPLETE CBC W/AUTO DIFF WBC: CPT

## 2022-05-16 PROCEDURE — 25010000002 VANCOMYCIN 1 G RECONSTITUTED SOLUTION: Performed by: INTERNAL MEDICINE

## 2022-05-16 PROCEDURE — 83735 ASSAY OF MAGNESIUM: CPT | Performed by: INTERNAL MEDICINE

## 2022-05-16 PROCEDURE — 99231 SBSQ HOSP IP/OBS SF/LOW 25: CPT | Performed by: SURGERY

## 2022-05-16 PROCEDURE — 94799 UNLISTED PULMONARY SVC/PX: CPT

## 2022-05-16 PROCEDURE — 0 MAGNESIUM SULFATE 4 GM/100ML SOLUTION: Performed by: INTERNAL MEDICINE

## 2022-05-16 RX ORDER — SODIUM CHLORIDE 0.9 % (FLUSH) 0.9 %
10 SYRINGE (ML) INJECTION AS NEEDED
Status: DISCONTINUED | OUTPATIENT
Start: 2022-05-16 | End: 2022-05-20 | Stop reason: HOSPADM

## 2022-05-16 RX ORDER — MAGNESIUM SULFATE HEPTAHYDRATE 40 MG/ML
4 INJECTION, SOLUTION INTRAVENOUS ONCE
Status: COMPLETED | OUTPATIENT
Start: 2022-05-16 | End: 2022-05-16

## 2022-05-16 RX ORDER — FUROSEMIDE 40 MG/1
40 TABLET ORAL DAILY
Status: DISCONTINUED | OUTPATIENT
Start: 2022-05-16 | End: 2022-05-20 | Stop reason: HOSPADM

## 2022-05-16 RX ORDER — LOSARTAN POTASSIUM 50 MG/1
50 TABLET ORAL
Status: DISCONTINUED | OUTPATIENT
Start: 2022-05-16 | End: 2022-05-20 | Stop reason: HOSPADM

## 2022-05-16 RX ORDER — SODIUM CHLORIDE 0.9 % (FLUSH) 0.9 %
10 SYRINGE (ML) INJECTION EVERY 12 HOURS SCHEDULED
Status: DISCONTINUED | OUTPATIENT
Start: 2022-05-16 | End: 2022-05-20 | Stop reason: HOSPADM

## 2022-05-16 RX ORDER — LISINOPRIL 2.5 MG/1
5 TABLET ORAL
Status: DISCONTINUED | OUTPATIENT
Start: 2022-05-16 | End: 2022-05-16

## 2022-05-16 RX ADMIN — CEFEPIME HYDROCHLORIDE 2 G: 2 INJECTION, POWDER, FOR SOLUTION INTRAVENOUS at 16:24

## 2022-05-16 RX ADMIN — CEFEPIME HYDROCHLORIDE 2 G: 2 INJECTION, POWDER, FOR SOLUTION INTRAVENOUS at 08:36

## 2022-05-16 RX ADMIN — LOSARTAN POTASSIUM 50 MG: 50 TABLET, FILM COATED ORAL at 21:12

## 2022-05-16 RX ADMIN — NICOTINE TRANSDERMAL SYSTEM 1 PATCH: 21 PATCH, EXTENDED RELEASE TRANSDERMAL at 08:36

## 2022-05-16 RX ADMIN — FUROSEMIDE 40 MG: 40 TABLET ORAL at 21:12

## 2022-05-16 RX ADMIN — LORAZEPAM 0.5 MG: 0.5 TABLET ORAL at 08:41

## 2022-05-16 RX ADMIN — VANCOMYCIN HYDROCHLORIDE 1000 MG: 1 INJECTION, POWDER, LYOPHILIZED, FOR SOLUTION INTRAVENOUS at 18:20

## 2022-05-16 RX ADMIN — Medication 10 ML: at 08:36

## 2022-05-16 RX ADMIN — LORAZEPAM 1 MG: 1 TABLET ORAL at 03:51

## 2022-05-16 RX ADMIN — LORAZEPAM 0.5 MG: 0.5 TABLET ORAL at 14:09

## 2022-05-16 RX ADMIN — FOLIC ACID 1 MG: 1 TABLET ORAL at 08:35

## 2022-05-16 RX ADMIN — POLYETHYLENE GLYCOL (3350) 17 G: 17 POWDER, FOR SOLUTION ORAL at 08:36

## 2022-05-16 RX ADMIN — DOCUSATE SODIUM 100 MG: 100 CAPSULE ORAL at 08:35

## 2022-05-16 RX ADMIN — LORAZEPAM 0.5 MG: 0.5 TABLET ORAL at 21:12

## 2022-05-16 RX ADMIN — Medication 10 ML: at 21:12

## 2022-05-16 RX ADMIN — Medication 2 TABLET: at 08:35

## 2022-05-16 RX ADMIN — VANCOMYCIN HYDROCHLORIDE 1000 MG: 1 INJECTION, POWDER, LYOPHILIZED, FOR SOLUTION INTRAVENOUS at 03:44

## 2022-05-16 RX ADMIN — DOCUSATE SODIUM 100 MG: 100 CAPSULE ORAL at 21:12

## 2022-05-16 RX ADMIN — LISINOPRIL 5 MG: 2.5 TABLET ORAL at 10:23

## 2022-05-16 RX ADMIN — HYDROMORPHONE HYDROCHLORIDE 0.5 MG: 1 INJECTION, SOLUTION INTRAMUSCULAR; INTRAVENOUS; SUBCUTANEOUS at 06:03

## 2022-05-16 RX ADMIN — HYDROMORPHONE HYDROCHLORIDE 0.5 MG: 1 INJECTION, SOLUTION INTRAMUSCULAR; INTRAVENOUS; SUBCUTANEOUS at 10:23

## 2022-05-16 RX ADMIN — HYDROMORPHONE HYDROCHLORIDE 0.5 MG: 1 INJECTION, SOLUTION INTRAMUSCULAR; INTRAVENOUS; SUBCUTANEOUS at 00:47

## 2022-05-16 RX ADMIN — VANCOMYCIN HYDROCHLORIDE 1000 MG: 1 INJECTION, POWDER, LYOPHILIZED, FOR SOLUTION INTRAVENOUS at 13:36

## 2022-05-16 RX ADMIN — HYDROMORPHONE HYDROCHLORIDE 0.5 MG: 1 INJECTION, SOLUTION INTRAMUSCULAR; INTRAVENOUS; SUBCUTANEOUS at 18:19

## 2022-05-16 RX ADMIN — Medication 10 ML: at 21:13

## 2022-05-16 RX ADMIN — HYDROMORPHONE HYDROCHLORIDE 0.5 MG: 1 INJECTION, SOLUTION INTRAMUSCULAR; INTRAVENOUS; SUBCUTANEOUS at 14:13

## 2022-05-16 RX ADMIN — MAGNESIUM SULFATE HEPTAHYDRATE 4 G: 40 INJECTION, SOLUTION INTRAVENOUS at 08:36

## 2022-05-16 NOTE — PROGRESS NOTES
Chief complaint: Abdominal pain    Abdominal pain somewhat improved this morning but still persist.  Pain is in the lower abdomen.    Afebrile, vital signs stable  Clear to auscultation bilaterally  Abdomen soft, tender to palpation in the lower abdomen without rebound or guarding  Ascites containing reducible umbilical hernia    36-year-old male with cirrhosis secondary to alcohol abuse with worsening abdominal pain likely secondary to constipation but SBP cannot be excluded.  No significant fluid to tap for culture at this time  -Continue antibiotic  -Continue bowel regimen  -No surgical intervention at this time.

## 2022-05-16 NOTE — CASE MANAGEMENT/SOCIAL WORK
Discharge Planning Assessment  [facility]     Patient Name: Wilfredo Lau  MRN: [id number]  Today's Date: 5/16/2022    Admit Date: 5/13/2022     Discharge Needs Assessment     Row Name 05/16/22 1541       Living Environment    People in Home alone    Current Living Arrangements hotel/motel    Primary Care Provided by self    Provides Primary Care For no one    Family Caregiver if Needed none    Quality of Family Relationships unable to assess    Able to Return to Prior Arrangements yes    Living Arrangement Comments Staying at the [facility]       Resource/Environmental Concerns    Resource/Environmental Concerns none    Financial Concerns none    Transportation Concerns no car       Transition Planning    Patient/Family Anticipates Transition to home    Patient/Family Anticipated Services at Transition none    Transportation Anticipated public transportation       Discharge Needs Assessment    Equipment Currently Used at Home none    Concerns to be Addressed substance/tobacco abuse/use    Anticipated Changes Related to Illness none    Current Discharge Risk substance use/abuse               Discharge Plan     Row Name 05/16/22 1543       Plan    Plan SS spoke with pt on this date for discharge planning. Pt admitted from [facility]. Pt lives alone at the [facility]. Pt does not utilize home health services, [facility], or have a PCP. Pt does not have a POA or living will on file. Pt will need assistance with transportation at discharge. Pt states that after he gets his belongings and gets paid again on the first that he plans to return to [place]. Pt inquired about a taxi service transporting him back to [place], SS advised pt there was a [employer] stop in [place]. SS to follow and assist.            Demographic Summary     Row Name 05/16/22 1541       General Information    Referral Source nursing    Reason for Consult --  patient nees assistance with living conditions              ALISON Benton

## 2022-05-16 NOTE — PROGRESS NOTES
Patient Identification:  Name:  Wilfredo Lau  Age:  36 y.o.  Sex:  male  :  1985  MRN:  [id number]  Visit Number:  [id number]  Primary Care Provider:  Provider, No Known    Length of stay:  3    Subjective/Interval History/Consultants/Procedures     Chief Complaint: abdominal pain.         Subjective/Interval History:    36 y.o. male who was admitted on 2022 with large volume ascites from the [facility].       PMH is significant for Bipolar 1 disorder, PTSD, Alcohol use disorder with current alcohol withdraw, cirrhosis of the liver.   For complete admission information, please see history and physical.      Consultations:  1. Othopedic  2. General surgery     Procedures/Scans:  1. CT abomen and pelvis wo x2   2. Liver ultrasound  3.ECHO     The patient was started on  IV rocephin as well as Albumin for possible SBP. A CT of the abdomen and pelvis without contrast shows large volume ascites. However, the ascites appears to be perihepatic on ultrasound and is not drainable per paracentesis. He remains pancytopenic without overt signs or symptoms of bleeding. He also has persistent hypomagnesemia that is replaced as needed. He has has contained abdominal pain. General surgery was consulted and felt that is abdominal pain is possibly 2/2 to constipation. A Bowel regimen was ordered. The patient has reported regular bowel movements. Last one noted the evening of 5/15/2022. Because of persistent abominal pain, his antibiotic coverage was expanded to vancomycin and cefepime. The patients remains on the CIWA protocol and is being treated with ativan. He has remained A&O with ativan control withdraw symptoms well. He does have moments of illogical thoughts according to nursing documentation.  There was evidence of AVN of the R hip noted on a CT, ortho was consulted, patient can follow up on an out patient basis.        The patient was up and moving about his room this morning, still complains of  abdominal pain, however it has seem to improved since the day prior. Diffuse tenderness to palpation but improved.  He still has tremors occasionally on exam but remains A&O. He denies chest pain, shortness of breath, nausea, vomiting, diarrhea. He had a bowel movement on 5/15/2022, without blood or mucous noted in stool.  He has had persistently elevated blood pressures. I started a low dose lisinopril for better blood pressure control. He has no further complaints today.     ----------------------------------------------------------------------------------------------------------------------  [facility] Meds:  B-complex with vitamin C, 2 tablet, Oral, Daily  cefepime, 2 g, Intravenous, Q8H  docusate sodium, 100 mg, Oral, BID  folic acid, 1 mg, Oral, Daily  lisinopril, 5 mg, Oral, Q24H  LORazepam, 0.5 mg, Oral, 3 times per day  magnesium sulfate, 4 g, Intravenous, Once  nicotine, 1 patch, Transdermal, Q24H  polyethylene glycol, 17 g, Oral, Daily  sodium chloride, 10 mL, Intravenous, Q12H  thiamine, 100 mg, Oral, Daily  vancomycin, 1,000 mg, Intravenous, Q8H         ----------------------------------------------------------------------------------------------------------------------      Objective     Vital Signs:  Temp:  [98.1 °F (36.7 °C)-98.4 °F (36.9 °C)] 98.1 °F (36.7 °C)  Heart Rate:  [] 118  Resp:  [12-22] 12  BP: (138-168)/() 151/94      05/14/22  0659 05/15/22  0438 05/16/22  0500   Weight: 98.4 kg (217 lb) 99.2 kg (218 lb 9.6 oz) 101 kg (222 lb 14.4 oz)     Body mass index is 27.14 kg/m².    Intake/Output Summary (Last 24 hours) at 5/16/2022 0905  Last data filed at 5/16/2022 0615  Gross per 24 hour   Intake 1238.75 ml   Output 1550 ml   Net -311.25 ml     No intake/output data recorded.  Diet Regular  ----------------------------------------------------------------------------------------------------------------------    Physical Exam  Vitals and nursing note reviewed.   Constitutional:        General: He is awake.      Appearance: He is normal weight.   HENT:      Head: Normocephalic and atraumatic.      Mouth/Throat:      Lips: Pink.      Mouth: Mucous membranes are moist.   Cardiovascular:      Rate and Rhythm: Regular rhythm. Tachycardia present.   Pulmonary:      Effort: Pulmonary effort is normal.      Breath sounds: Normal breath sounds and air entry.   Abdominal:      General: Abdomen is flat. Bowel sounds are normal.      Palpations: Abdomen is soft.      Tenderness: There is generalized abdominal tenderness.      Hernia: A hernia is present. Hernia is present in the umbilical area.   Musculoskeletal:      Right lower leg: No edema.      Left lower leg: No edema.   Skin:     General: Skin is warm and dry.      Capillary Refill: Capillary refill takes less than 2 seconds.   Neurological:      General: No focal deficit present.      Mental Status: He is alert and oriented to person, place, and time.   Psychiatric:         Attention and Perception: Attention normal.         Mood and Affect: Mood normal.         Behavior: Behavior is cooperative.       ----------------------------------------------------------------------------------------------------------------------  Tele:        ----------------------------------------------------------------------------------------------------------------------      Results from last 7 days   Lab Units 05/15/22  1133 05/15/22  0643 05/14/22  0825 05/14/22  0025 05/13/22  1926 05/13/22  1627 05/13/22  0711 05/12/22  1048 05/12/22  0748 05/12/22  0650   CRP mg/dL  --   --   --  0.36  --  0.42  --   --   --  0.65*   LACTATE mmol/L  --   --  1.6  --  2.8*  --   --  1.3   < >  --    WBC 10*3/mm3 2.26* 2.24*  --  1.44*  --   --    < >  --   --  1.72*   HEMOGLOBIN g/dL 10.2* 10.2*  --  10.7*  --   --    < >  --   --  12.0*   HEMATOCRIT % 31.2* 30.4*  --  32.9*  --   --    < >  --   --  36.6*   MCV fL 98.4* 98.1*  --  99.1*  --   --    < >  --   --  98.7*   MCHC  g/dL 32.7 33.6  --  32.5  --   --    < >  --   --  32.8   PLATELETS 10*3/mm3 62* 59*  --  50*  --   --    < >  --   --  46*   INR   --   --   --  1.83*  --   --   --   --   --  1.77*    < > = values in this interval not displayed.         Results from last 7 days   Lab Units 05/16/22  0054 05/15/22  0139 05/14/22  1359 05/14/22  0025   SODIUM mmol/L 137 135*  --  137   POTASSIUM mmol/L 4.0 4.0  --  4.1   MAGNESIUM mg/dL 1.6 1.5* 1.4*  --    CHLORIDE mmol/L 104 104  --  105   CO2 mmol/L 24.2 23.4  --  23.9   BUN mg/dL 6 6  --  8   CREATININE mg/dL 0.59* 0.56*  --  0.65*   CALCIUM mg/dL 8.9 8.7  --  8.4*   GLUCOSE mg/dL 107* 122*  --  135*   ALBUMIN g/dL 4.09 3.41*  --  2.86*   BILIRUBIN mg/dL 4.1* 3.9*  --  4.5*   ALK PHOS U/L 91 106  --  107   AST (SGOT) U/L 73* 80*  --  68*   ALT (SGPT) U/L 31 33  --  36   Estimated Creatinine Clearance: 249.6 mL/min (A) (by C-G formula based on SCr of 0.59 mg/dL (L)).  No results found for: AMMONIA      Blood Culture   Date Value Ref Range Status   05/12/2022 No growth at 4 days  Preliminary   05/12/2022 No growth at 4 days  Preliminary     No results found for: URINECX  No results found for: WOUNDCX  No results found for: STOOLCX  ----------------------------------------------------------------------------------------------------------------------  Imaging Results (Last 24 Hours)     Procedure Component Value Units Date/Time    CT Abdomen Pelvis Without Contrast [[id number]] Collected: 05/15/22 1614     Updated: 05/15/22 1616    Narrative:      CT Abdomen Pelvis WO    INDICATION:   Worsening lower abdominal pain, greater on the left side.    TECHNIQUE:   CT of the abdomen and pelvis without IV contrast. Coronal and sagittal reconstructions were obtained.  Radiation dose reduction techniques included automated exposure control or exposure modulation based on body size. Count of known CT and cardiac nuc  med studies performed in previous 12 months: 1.     COMPARISON:   CT abdomen  pelvis 5/13/2022    FINDINGS:  Visualized lung bases are unremarkable.    Abdomen:   Cirrhotic morphology of the liver is again noted. Splenomegaly. Moderate to large volume ascites again noted, similar to the prior exam. Suspected multiple upper abdominal varices. The pancreas is grossly unremarkable. The gallbladder may be surgically  absent. Correlation with operative history is recommended. Both adrenal glands are normal. The kidneys are normal. Abdominal aorta normal in course and caliber. Small bowel is unremarkable without obstruction. Normal appendix. Moderate stool burden. The  colon is otherwise unremarkable. No free air. Small umbilical hernia containing fat and fluid.    Pelvis:   The urinary bladder is unremarkable.  The prostate gland is unremarkable.     No acute osseous abnormality. Right femoral head avascular necrosis again noted.        Impression:        1. No acute abdominal or pelvic findings allowing for lack of IV contrast.  2. No interval change in cirrhotic morphology of the liver with sequelae of portal venous hypertension. Splenomegaly and moderate to large volume abdominal and pelvic ascites is not significantly changed from the prior exam.  3. Moderate stool burden.          Signer Name: Kwame Oliveros MD   Signed: 5/15/2022 4:14 PM   Workstation Name: [id number]    [facility]        ----------------------------------------------------------------------------------------------------------------------   I have reviewed the above laboratory values for 05/16/22    Assessment/Plan     Active Hospital Problems    Diagnosis  POA   • Hepatic encephalopathy (HCC) [K72.90]  Yes         -sereve sepsis (WBC 1.44, HR >90 ,lactate 2.8)  -large volume ascites, will likely benefit from paracentesis   -high risk for SBP  -Chronic cirrhosis of the liver 2/2 alcohol abuse  -pancytopenia  -elevated liver enzymes  -elevated bilirubin  -abnormal coagulation studies  -possible  upper abdominal varices on CT  Results of CT in HPI, large volume ascites. No drainable fluid  Expanded abx coverage to vancomycin and cefepime.    Acute hepatitis/HIV negative  Will add daily Protonix  WBC 2.24 today.   CRP normal.   PT/INR elevated but stable.   No thrombus on Liver US.   General surgery consult, feels abd pain 2/2 to constipation. Bowel regimen was ordered with successful bowel movement. Slight improvement in abdominal pain today.  Pancytopenia remains stable.   CBC in AM      -hypomagnesemia  -hypokalemia  Will replace per protocol   CMP in AM  Magnesium 1.6 today, replace per protocol.      -alcohol abuse (last drink just prior to ED presentation)  -history of substance abuse  -depression  -suicidal ideation  CIWA protocol  Visible tremors of exam.  100mg  thiamine and 1mg folate.   Continued telemetry monitoring  Seizure precautions     -hypertension  Possibly related to ETOH withdraw  Blood pressure consistently elevated. Added 5mg lisinopril   ECHO with mitral valve regurgitation and tricuspid regurgitation. EF 56-60%     -questionable avascular necrosis of the right femoral head  orthopedic consult for evaluation   Follow up outpatient      ----------  -DVT prophylaxis: Scds. Patient pancytopenic, will defer anticoagulation at this time  -Activity: as tolerated   -Disposition per hospital course     High risk secondary to ETOH abuse, substance abuse, cirrhosis, electrolyte imbalances     Code status: the patient is a FULL CODE       KEM Yu  05/15/22  07:18 EDT        KEM Yu  05/16/22  09:05 EDT

## 2022-05-16 NOTE — PLAN OF CARE
Goal Outcome Evaluation:  Plan of Care Reviewed With: patient        Progress: no change    Outcome Evaluation: VSS. ORLIN. EARLEWA protocol followed, treated with prn ativan. Complaints of pain, treated with prns.  No signs of distress. Bed alarm set, call light within reach.

## 2022-05-16 NOTE — PLAN OF CARE
Goal Outcome Evaluation:  Plan of Care Reviewed With: patient        Progress: improving  Outcome Evaluation: Patient vital signs currently stable and he remains on RA and tolerating it well. He is alert and oriented x4. Magnesium replaced today. Midline currently being placed. PRN pain medications given for lower abdominal pain. Otherwise, no significant changes noted.

## 2022-05-16 NOTE — PROGRESS NOTES
Inpatient Progress Note  Wilfredo Lau  Date: 05/16/22  MRN: [id number]      Subjective:   Orthopedic surgery is following the patient secondary to right hip avascular necrosis.  The patient notes no hip pain on exam today.  Is continuing to be asymptomatic with avascular necrosis of the right hip.  Notes he has no limitations in range of motion to the right hip.  The patient did have a repeat CT scan of the abdomen pelvis in which again avascular necrosis was noted.  The patient does have significant history of alcohol abuse.  He does note continued abdominal pain on exam today.        Objective:    Vitals:    05/16/22 1202 05/16/22 1302 05/16/22 1402 05/16/22 1502   BP: 137/99 133/89 135/86 134/85   Pulse: 114 90 91 95   Resp: 16 16 12 12   Temp:       TempSrc:       SpO2: 98% 95% 95% 95%   Weight:       Height:              Physical Exam:  Constitutional: Chronically ill male.  No respiratory distress.        Musculoskeletal: On examination of the right hip there is no cellulitis or erythema noted.  Patient has full range of motion of the right hip with internal and external rotation.  The patient has no pain elicited with range of motion of the right hip, dorsiflexion plantarflexion intact right ankle.  No foot drop abnormality is noted.      Labs:    Results from last 7 days   Lab Units 05/16/22  0930 05/15/22  1133 05/15/22  0643 05/14/22  0825 05/14/22  0025 05/13/22  1926 05/13/22  1627 05/13/22  0711 05/12/22  1048 05/12/22  0748 05/12/22  0650   CRP mg/dL  --   --   --   --  0.36  --  0.42  --   --   --  0.65*   LACTATE mmol/L  --   --   --  1.6  --  2.8*  --   --  1.3   < >  --    WBC 10*3/mm3 1.87* 2.26* 2.24*  --  1.44*  --   --    < >  --   --  1.72*   HEMOGLOBIN g/dL 10.1* 10.2* 10.2*  --  10.7*  --   --    < >  --   --  12.0*   HEMATOCRIT % 30.9* 31.2* 30.4*  --  32.9*  --   --    < >  --   --  36.6*   MCV fL 98.7* 98.4* 98.1*  --  99.1*  --   --    < >  --   --  98.7*   MCHC g/dL 32.7 32.7 33.6   --  32.5  --   --    < >  --   --  32.8   PLATELETS 10*3/mm3 59* 62* 59*  --  50*  --   --    < >  --   --  46*   INR   --   --   --   --  1.83*  --   --   --   --   --  1.77*    < > = values in this interval not displayed.         Results from last 7 days   Lab Units 05/16/22  0054 05/15/22  0139 05/14/22  1359 05/14/22  0025   SODIUM mmol/L 137 135*  --  137   POTASSIUM mmol/L 4.0 4.0  --  4.1   MAGNESIUM mg/dL 1.6 1.5* 1.4*  --    CHLORIDE mmol/L 104 104  --  105   CO2 mmol/L 24.2 23.4  --  23.9   BUN mg/dL 6 6  --  8   CREATININE mg/dL 0.59* 0.56*  --  0.65*   CALCIUM mg/dL 8.9 8.7  --  8.4*   GLUCOSE mg/dL 107* 122*  --  135*   ALBUMIN g/dL 4.09 3.41*  --  2.86*   BILIRUBIN mg/dL 4.1* 3.9*  --  4.5*   ALK PHOS U/L 91 106  --  107   AST (SGOT) U/L 73* 80*  --  68*   ALT (SGPT) U/L 31 33  --  36   Estimated Creatinine Clearance: 249.6 mL/min (A) (by C-G formula based on SCr of 0.59 mg/dL (L)).  No results found for: AMMONIA          No results found for: HGBA1C  No results found for: TSH, FREET4      Pain Management Panel     Pain Management Panel Latest Ref Rng & Units 5/12/2022    AMPHETAMINES SCREEN, URINE Negative Negative    BARBITURATES SCREEN Negative Negative    BENZODIAZEPINE SCREEN, URINE Negative Positive(A)    BUPRENORPHINEUR Negative Negative    COCAINE SCREEN, URINE Negative Negative    METHADONE SCREEN, URINE Negative Negative    METHAMPHETAMINEUR Negative Negative          Blood Culture   Date Value Ref Range Status   05/12/2022 No growth at 4 days  Preliminary   05/12/2022 No growth at 4 days  Preliminary     No results found for: URINECX  No results found for: WOUNDCX  No results found for: STOOLCX              Radiology:  Imaging Results (Last 72 Hours)     Procedure Component Value Units Date/Time    CT Abdomen Pelvis Without Contrast [[id number]] Collected: 05/15/22 1614     Updated: 05/15/22 1616    Narrative:      CT Abdomen Pelvis WO    INDICATION:   Worsening lower abdominal pain, greater  on the left side.    TECHNIQUE:   CT of the abdomen and pelvis without IV contrast. Coronal and sagittal reconstructions were obtained.  Radiation dose reduction techniques included automated exposure control or exposure modulation based on body size. Count of known CT and cardiac nuc  med studies performed in previous 12 months: 1.     COMPARISON:   CT abdomen pelvis 5/13/2022    FINDINGS:  Visualized lung bases are unremarkable.    Abdomen:   Cirrhotic morphology of the liver is again noted. Splenomegaly. Moderate to large volume ascites again noted, similar to the prior exam. Suspected multiple upper abdominal varices. The pancreas is grossly unremarkable. The gallbladder may be surgically  absent. Correlation with operative history is recommended. Both adrenal glands are normal. The kidneys are normal. Abdominal aorta normal in course and caliber. Small bowel is unremarkable without obstruction. Normal appendix. Moderate stool burden. The  colon is otherwise unremarkable. No free air. Small umbilical hernia containing fat and fluid.    Pelvis:   The urinary bladder is unremarkable.  The prostate gland is unremarkable.     No acute osseous abnormality. Right femoral head avascular necrosis again noted.        Impression:        1. No acute abdominal or pelvic findings allowing for lack of IV contrast.  2. No interval change in cirrhotic morphology of the liver with sequelae of portal venous hypertension. Splenomegaly and moderate to large volume abdominal and pelvic ascites is not significantly changed from the prior exam.  3. Moderate stool burden.          Signer Name: Kwame Oliveros MD   Signed: 5/15/2022 4:14 PM   Workstation Name: [id number]    [facility]    XR Hip With or Without Pelvis 2 - 3 View Right [[id number]] Collected: 05/14/22 1040     Updated: 05/14/22 1042    Narrative:      CR Hip Uni Comp Min 2 Vws left    INDICATION:   Acute left hip pain with concern for avascular  necrosis    COMPARISON:   None.    FINDINGS:  AP pelvis, AP left hip, and frog-leg lateral view(s) of the left hip.  No fracture or dislocation. No bone erosion or destruction.        Impression:      Negative examination. No radiographic evidence of avascular necrosis.    Signer Name: Toni Oliver MD   Signed: 5/14/2022 10:40 AM   [facility]    [facility] Liver [[id number]] Collected: 05/14/22 1020     Updated: 05/14/22 1022    Narrative:      US Liver Or Hepatic    INDICATION:   Portal vein thrombosis. Cirrhosis with ascites and splenomegaly.    COMPARISON:   5/12/2022    FINDINGS:  No changes are seen since the recent abdominal ultrasound. Again noted is a cirrhotic architecture to the liver with ascites. Color flow imaging and Doppler spectral waveform analysis show patency of the portal vein with flow toward the liver. No hepatic  vein obstructions are seen. Splenomegaly is again noted.      Impression:      No evidence of portal vein thrombosis    Signer Name: Toni Oliver MD   Signed: 5/14/2022 10:20 AM   Workstation Name: [facility]    [facility]iology [facility]            Assessment:   #1 right hip avascular necrosis          Plan:   The patient is a symptomatic with his right hip AVN at this time.  There is no emergent surgical intervention recommended for the avascular necrosis from orthopedic standpoint.  Patient is welcome to follow-up with orthopedic surgery on an outpatient basis for continued follow-up of the AVN.  The patient verbalized understanding of findings and is agreeable to the plan of care.  Reconsult orthopedic surgery as needed.        Igor Zuleta, APRN May 16, 2022 15:38 EDT

## 2022-05-16 NOTE — DISCHARGE SUMMARY
"      PSYCHIATRIC DISCHARGE SUMMARY     Patient Identification:  Name:  Wilfredo Lau  Age:  36 y.o.  Sex:  male  :  1985  MRN:  [id number]  Visit Number:  [id number]    Date of Admission:2022   Date of Discharge: 2022     Discharge Diagnosis:  Active Problems:    Bipolar affective disorder, mixed (HCC)    Post traumatic stress disorder (PTSD)    Alcohol use disorder, severe, dependence ([facility])      Admission Diagnosis:  MDD (major depressive disorder) [F32.9]     Hospital Course  Patient is a 36 y.o. male presented with mood disturbance and psychosis.  Admitted for crisis stabilization.  Multiple lab abnormalities on admission, for which hospitalist was consulted.  Patient manic and psychotic symptoms appeared to improve fairly quickly following patient getting some sleep, suggesting the effect of either illicit substance intoxication/withdrawal, significant insomnia or personality components, or some combination of the above.  He was started on fluoxetine 20 mg daily, olanzapine 5 mg nightly and prazosin 1 mg nightly.  Due to worsening abdominal pain and ascites, in the setting of chronic cirrhosis, patient will be discharged to be transferred to the medical floor for further management.    On the day of discharge, patient denied SI, HI or AVH. Patient was stable and appropriate by the conclusion of this admission, denying significant symptoms of mood, psychotic or thought disorder. Patient showed improvement of presenting symptoms and was deemed appropriate for discharge today.    Mental Status Exam upon discharge:   Mood \"better\"   Affect-congruent, appropriate, stable  Thought Content-goal directed, no delusional material present  Thought process-linear, organized.  Suicidality: No SI  Homicidality: No HI  Perception: No AH/VH    Procedures Performed         Consults:   Consults     Date and Time Order Name Status Description    2022  1:17 PM Inpatient Hospitalist Consult Completed     "       Pertinent Test Results:   Lab Results (last 7 days)     Procedure Component Value Units Date/Time    Lactic Acid, Plasma [[id number]]  (Abnormal) Collected: 05/13/22 1926    Specimen: Blood Updated: 05/13/22 2002     Lactate 2.8 mmol/L     C-reactive Protein [[id number]]  (Normal) Collected: 05/13/22 1627    Specimen: Blood Updated: 05/13/22 1926     C-Reactive Protein 0.42 mg/dL     Lipase [[id number]]  (Abnormal) Collected: 05/13/22 1627    Specimen: Blood Updated: 05/13/22 1655     Lipase 90 U/L     Amylase [[id number]]  (Normal) Collected: 05/13/22 1627    Specimen: Blood Updated: 05/13/22 1655     Amylase 38 U/L     Ferritin [[id number]]  (Normal) Collected: 05/13/22 0711    Specimen: Blood Updated: 05/13/22 0846     Ferritin 227.40 ng/mL     Narrative:      Results may be falsely decreased if patient taking Biotin.      Iron Profile [[id number]]  (Abnormal) Collected: 05/13/22 0711    Specimen: Blood Updated: 05/13/22 0840     Iron 151 mcg/dL      Iron Saturation 84 %      Transferrin 120 mg/dL      TIBC 179 mcg/dL     Comprehensive Metabolic Panel [[id number]]  (Abnormal) Collected: 05/13/22 0711    Specimen: Blood Updated: 05/13/22 0826     Glucose 120 mg/dL      BUN 11 mg/dL      Creatinine 0.64 mg/dL      Sodium 139 mmol/L      Potassium 4.2 mmol/L      Chloride 109 mmol/L      CO2 24.1 mmol/L      Calcium 8.2 mg/dL      Total Protein 6.0 g/dL      Albumin 2.48 g/dL      ALT (SGPT) 34 U/L      AST (SGOT) 64 U/L      Alkaline Phosphatase 89 U/L      Total Bilirubin 5.1 mg/dL      Globulin 3.5 gm/dL      A/G Ratio 0.7 g/dL      BUN/Creatinine Ratio 17.2     Anion Gap 5.9 mmol/L      eGFR 126.6 mL/min/1.73      Comment: National Kidney Foundation and American Society of Nephrology (ASN) Task Force recommended calculation based on the Chronic Kidney Disease Epidemiology Collaboration (CKD-EPI) equation refit without adjustment for race.       Narrative:      GFR Normal >60  Chronic Kidney Disease <60  Kidney  Failure <15      Phosphorus [[id number]]  (Normal) Collected: 05/13/22 0711    Specimen: Blood Updated: 05/13/22 0811     Phosphorus 2.8 mg/dL     Magnesium [[id number]]  (Abnormal) Collected: 05/13/22 0711    Specimen: Blood Updated: 05/13/22 0811     Magnesium 1.4 mg/dL     CBC & Differential [[id number]]  (Abnormal) Collected: 05/13/22 0711    Specimen: Blood Updated: 05/13/22 0803    Narrative:      The following orders were created for panel order CBC & Differential.  Procedure                               Abnormality         Status                     ---------                               -----------         ------                     CBC Auto Differential[[id number]]        Abnormal            Final result                 Please view results for these tests on the individual orders.    CBC Auto Differential [[id number]]  (Abnormal) Collected: 05/13/22 0711    Specimen: Blood Updated: 05/13/22 0803     WBC 1.43 10*3/mm3      RBC 3.12 10*6/mm3      Hemoglobin 10.0 g/dL      Hematocrit 30.9 %      MCV 99.0 fL      MCH 32.1 pg      MCHC 32.4 g/dL      RDW 17.3 %      RDW-SD 63.6 fl      MPV 11.1 fL      Platelets 60 10*3/mm3      Neutrophil % 53.8 %      Lymphocyte % 20.3 %      Monocyte % 16.1 %      Eosinophil % 7.7 %      Basophil % 1.4 %      Immature Grans % 0.7 %      Neutrophils, Absolute 0.77 10*3/mm3      Lymphocytes, Absolute 0.29 10*3/mm3      Monocytes, Absolute 0.23 10*3/mm3      Eosinophils, Absolute 0.11 10*3/mm3      Basophils, Absolute 0.02 10*3/mm3      Immature Grans, Absolute 0.01 10*3/mm3      nRBC 0.0 /100 WBC     Bilirubin, Direct [[id number]]  (Abnormal) Collected: 05/12/22 0748    Specimen: Blood Updated: 05/12/22 1900     Bilirubin, Direct 2.3 mg/dL     Hepatitis Panel, Acute [[id number]]  (Normal) Collected: 05/12/22 1435    Specimen: Blood Updated: 05/12/22 1519     Hepatitis B Surface Ag Non-Reactive     Hep A IgM Non-Reactive     Hep B C IgM Non-Reactive     Hepatitis C Ab Non-Reactive     Narrative:      Results may be falsely decreased if patient taking Biotin.           Condition on Discharge:  stable    Vital Signs  Temp:  [98.1 °F (36.7 °C)-98.5 °F (36.9 °C)] 98.5 °F (36.9 °C)  Heart Rate:  [] 114  Resp:  [12-22] 16  BP: (135-168)/() 137/99    Discharge Disposition:  Nursing Facility ([facility])    Discharge Medications:     Discharge Medications      Patient Not Prescribed Medications Upon Discharge         Discharge Diet: Normal  Diet Instructions    Regular            Activity at Discharge: Normal  Activity Instructions    As tolerated            Follow-up Appointments  No future appointments.      Test Results Pending at Discharge  None     Time: I spent less than 30 minutes on this discharge activity which included: face-to-face encounter with the patient, reviewing the data in the system, coordination of the care with the nursing staff as well as consultants, documentation, and entering orders.      Clinician:   Nikko Lai MD  05/16/22  14:07 EDT

## 2022-05-17 ENCOUNTER — APPOINTMENT (OUTPATIENT)
Dept: CT IMAGING | Facility: HOSPITAL | Age: 37
End: 2022-05-17

## 2022-05-17 LAB
ALBUMIN SERPL-MCNC: 4.17 G/DL (ref 3.5–5.2)
ALBUMIN/GLOB SERPL: 1.2 G/DL
ALP SERPL-CCNC: 98 U/L (ref 39–117)
ALT SERPL W P-5'-P-CCNC: 42 U/L (ref 1–41)
ANION GAP SERPL CALCULATED.3IONS-SCNC: 11 MMOL/L (ref 5–15)
AST SERPL-CCNC: 85 U/L (ref 1–40)
BACTERIA SPEC AEROBE CULT: NORMAL
BACTERIA SPEC AEROBE CULT: NORMAL
BASOPHILS # BLD AUTO: 0.01 10*3/MM3 (ref 0–0.2)
BASOPHILS NFR BLD AUTO: 0.3 % (ref 0–1.5)
BILIRUB SERPL-MCNC: 5.8 MG/DL (ref 0–1.2)
BUN SERPL-MCNC: 8 MG/DL (ref 6–20)
BUN/CREAT SERPL: 11.4 (ref 7–25)
CALCIUM SPEC-SCNC: 9.5 MG/DL (ref 8.6–10.5)
CHLORIDE SERPL-SCNC: 99 MMOL/L (ref 98–107)
CO2 SERPL-SCNC: 23 MMOL/L (ref 22–29)
CREAT SERPL-MCNC: 0.7 MG/DL (ref 0.76–1.27)
DEPRECATED RDW RBC AUTO: 62.5 FL (ref 37–54)
EGFRCR SERPLBLD CKD-EPI 2021: 123.2 ML/MIN/1.73
EOSINOPHIL # BLD AUTO: 0.15 10*3/MM3 (ref 0–0.4)
EOSINOPHIL NFR BLD AUTO: 4.4 % (ref 0.3–6.2)
ERYTHROCYTE [DISTWIDTH] IN BLOOD BY AUTOMATED COUNT: 17.2 % (ref 12.3–15.4)
GLOBULIN UR ELPH-MCNC: 3.5 GM/DL
GLUCOSE SERPL-MCNC: 154 MG/DL (ref 65–99)
HCT VFR BLD AUTO: 35.2 % (ref 37.5–51)
HGB BLD-MCNC: 11.4 G/DL (ref 13–17.7)
IMM GRANULOCYTES # BLD AUTO: 0.01 10*3/MM3 (ref 0–0.05)
IMM GRANULOCYTES NFR BLD AUTO: 0.3 % (ref 0–0.5)
LYMPHOCYTES # BLD AUTO: 0.37 10*3/MM3 (ref 0.7–3.1)
LYMPHOCYTES NFR BLD AUTO: 10.9 % (ref 19.6–45.3)
MAGNESIUM SERPL-MCNC: 1.4 MG/DL (ref 1.6–2.6)
MCH RBC QN AUTO: 32 PG (ref 26.6–33)
MCHC RBC AUTO-ENTMCNC: 32.4 G/DL (ref 31.5–35.7)
MCV RBC AUTO: 98.9 FL (ref 79–97)
MONOCYTES # BLD AUTO: 0.25 10*3/MM3 (ref 0.1–0.9)
MONOCYTES NFR BLD AUTO: 7.4 % (ref 5–12)
NEUTROPHILS NFR BLD AUTO: 2.61 10*3/MM3 (ref 1.7–7)
NEUTROPHILS NFR BLD AUTO: 76.7 % (ref 42.7–76)
NRBC BLD AUTO-RTO: 0 /100 WBC (ref 0–0.2)
PLATELET # BLD AUTO: 74 10*3/MM3 (ref 140–450)
PMV BLD AUTO: 9.8 FL (ref 6–12)
POTASSIUM SERPL-SCNC: 3.9 MMOL/L (ref 3.5–5.2)
PROT SERPL-MCNC: 7.7 G/DL (ref 6–8.5)
RBC # BLD AUTO: 3.56 10*6/MM3 (ref 4.14–5.8)
SODIUM SERPL-SCNC: 133 MMOL/L (ref 136–145)
VANCOMYCIN TROUGH SERPL-MCNC: 9.1 MCG/ML (ref 5–20)
WBC NRBC COR # BLD: 3.4 10*3/MM3 (ref 3.4–10.8)

## 2022-05-17 PROCEDURE — 85025 COMPLETE CBC W/AUTO DIFF WBC: CPT

## 2022-05-17 PROCEDURE — 99232 SBSQ HOSP IP/OBS MODERATE 35: CPT | Performed by: HOSPITALIST

## 2022-05-17 PROCEDURE — 25010000002 MAGNESIUM SULFATE 2 GM/50ML SOLUTION: Performed by: INTERNAL MEDICINE

## 2022-05-17 PROCEDURE — 80202 ASSAY OF VANCOMYCIN: CPT

## 2022-05-17 PROCEDURE — 94799 UNLISTED PULMONARY SVC/PX: CPT

## 2022-05-17 PROCEDURE — 83735 ASSAY OF MAGNESIUM: CPT

## 2022-05-17 PROCEDURE — 25010000002 VANCOMYCIN 1 G RECONSTITUTED SOLUTION: Performed by: INTERNAL MEDICINE

## 2022-05-17 PROCEDURE — 25010000002 HYDROMORPHONE PER 4 MG: Performed by: INTERNAL MEDICINE

## 2022-05-17 PROCEDURE — 25010000002 CEFEPIME PER 500 MG: Performed by: INTERNAL MEDICINE

## 2022-05-17 PROCEDURE — 74176 CT ABD & PELVIS W/O CONTRAST: CPT | Performed by: RADIOLOGY

## 2022-05-17 PROCEDURE — 80053 COMPREHEN METABOLIC PANEL: CPT

## 2022-05-17 PROCEDURE — 74176 CT ABD & PELVIS W/O CONTRAST: CPT

## 2022-05-17 RX ORDER — HYDROCODONE BITARTRATE AND ACETAMINOPHEN 5; 325 MG/1; MG/1
1 TABLET ORAL EVERY 6 HOURS PRN
Status: DISCONTINUED | OUTPATIENT
Start: 2022-05-17 | End: 2022-05-20 | Stop reason: HOSPADM

## 2022-05-17 RX ORDER — MAGNESIUM SULFATE HEPTAHYDRATE 40 MG/ML
2 INJECTION, SOLUTION INTRAVENOUS
Status: COMPLETED | OUTPATIENT
Start: 2022-05-17 | End: 2022-05-17

## 2022-05-17 RX ORDER — HYDROCODONE BITARTRATE AND ACETAMINOPHEN 5; 325 MG/1; MG/1
1 TABLET ORAL EVERY 8 HOURS PRN
Status: DISCONTINUED | OUTPATIENT
Start: 2022-05-17 | End: 2022-05-17

## 2022-05-17 RX ORDER — AMOXICILLIN 250 MG
1 CAPSULE ORAL 2 TIMES DAILY
Status: DISCONTINUED | OUTPATIENT
Start: 2022-05-17 | End: 2022-05-20 | Stop reason: HOSPADM

## 2022-05-17 RX ORDER — LACTULOSE 10 G/15ML
20 SOLUTION ORAL 3 TIMES DAILY
Status: DISCONTINUED | OUTPATIENT
Start: 2022-05-17 | End: 2022-05-20 | Stop reason: HOSPADM

## 2022-05-17 RX ORDER — FLUOXETINE HYDROCHLORIDE 20 MG/1
20 CAPSULE ORAL DAILY
Status: DISCONTINUED | OUTPATIENT
Start: 2022-05-17 | End: 2022-05-20 | Stop reason: HOSPADM

## 2022-05-17 RX ADMIN — HYDROMORPHONE HYDROCHLORIDE 0.5 MG: 1 INJECTION, SOLUTION INTRAMUSCULAR; INTRAVENOUS; SUBCUTANEOUS at 08:33

## 2022-05-17 RX ADMIN — HYDROMORPHONE HYDROCHLORIDE 0.5 MG: 1 INJECTION, SOLUTION INTRAMUSCULAR; INTRAVENOUS; SUBCUTANEOUS at 05:06

## 2022-05-17 RX ADMIN — CEFEPIME HYDROCHLORIDE 2 G: 2 INJECTION, POWDER, FOR SOLUTION INTRAVENOUS at 08:34

## 2022-05-17 RX ADMIN — MAGNESIUM SULFATE HEPTAHYDRATE 2 G: 40 INJECTION, SOLUTION INTRAVENOUS at 08:34

## 2022-05-17 RX ADMIN — HYDROCODONE BITARTRATE AND ACETAMINOPHEN 1 TABLET: 5; 325 TABLET ORAL at 22:44

## 2022-05-17 RX ADMIN — HYDROMORPHONE HYDROCHLORIDE 0.5 MG: 1 INJECTION, SOLUTION INTRAMUSCULAR; INTRAVENOUS; SUBCUTANEOUS at 12:19

## 2022-05-17 RX ADMIN — DOCUSATE SODIUM 50 MG AND SENNOSIDES 8.6 MG 1 TABLET: 8.6; 5 TABLET, FILM COATED ORAL at 20:47

## 2022-05-17 RX ADMIN — LOSARTAN POTASSIUM 50 MG: 50 TABLET, FILM COATED ORAL at 08:34

## 2022-05-17 RX ADMIN — VANCOMYCIN HYDROCHLORIDE 1000 MG: 1 INJECTION, POWDER, LYOPHILIZED, FOR SOLUTION INTRAVENOUS at 05:05

## 2022-05-17 RX ADMIN — HYDROCODONE BITARTRATE AND ACETAMINOPHEN 1 TABLET: 5; 325 TABLET ORAL at 17:00

## 2022-05-17 RX ADMIN — Medication 2 TABLET: at 08:34

## 2022-05-17 RX ADMIN — LORAZEPAM 0.5 MG: 0.5 TABLET ORAL at 01:40

## 2022-05-17 RX ADMIN — FLUOXETINE HYDROCHLORIDE 20 MG: 20 CAPSULE ORAL at 11:52

## 2022-05-17 RX ADMIN — FOLIC ACID 1 MG: 1 TABLET ORAL at 08:34

## 2022-05-17 RX ADMIN — Medication 10 ML: at 20:47

## 2022-05-17 RX ADMIN — Medication 100 MG: at 08:35

## 2022-05-17 RX ADMIN — MAGNESIUM SULFATE HEPTAHYDRATE 2 G: 40 INJECTION, SOLUTION INTRAVENOUS at 12:23

## 2022-05-17 RX ADMIN — CEFEPIME HYDROCHLORIDE 2 G: 2 INJECTION, POWDER, FOR SOLUTION INTRAVENOUS at 00:21

## 2022-05-17 RX ADMIN — HYDROMORPHONE HYDROCHLORIDE 0.5 MG: 1 INJECTION, SOLUTION INTRAMUSCULAR; INTRAVENOUS; SUBCUTANEOUS at 00:12

## 2022-05-17 RX ADMIN — CEFEPIME HYDROCHLORIDE 2 G: 2 INJECTION, POWDER, FOR SOLUTION INTRAVENOUS at 16:55

## 2022-05-17 RX ADMIN — Medication 10 ML: at 08:35

## 2022-05-17 RX ADMIN — LACTULOSE 20 G: 10 SOLUTION ORAL at 16:55

## 2022-05-17 RX ADMIN — NICOTINE TRANSDERMAL SYSTEM 1 PATCH: 21 PATCH, EXTENDED RELEASE TRANSDERMAL at 08:35

## 2022-05-17 RX ADMIN — MAGNESIUM SULFATE HEPTAHYDRATE 2 G: 40 INJECTION, SOLUTION INTRAVENOUS at 10:41

## 2022-05-17 RX ADMIN — FUROSEMIDE 40 MG: 40 TABLET ORAL at 08:34

## 2022-05-17 RX ADMIN — Medication 10 ML: at 08:34

## 2022-05-17 RX ADMIN — LACTULOSE 20 G: 10 SOLUTION ORAL at 20:46

## 2022-05-17 NOTE — PLAN OF CARE
Goal Outcome Evaluation:           Progress: improving  Outcome Evaluation: VSS. Remains on room air. Constantly complaining of lower abdominal pain but is improving throughout the day. Currently rates pain a 5/10 and is started on prn norco.

## 2022-05-17 NOTE — PLAN OF CARE
Goal Outcome Evaluation:  Plan of Care Reviewed With: patient        Progress: no change  Outcome Evaluation: LUPILLO ARREOLA protocol followed, treated with prn ativan. No complaints at this time, no signs of distress. Bed alarm set, call light within reach.

## 2022-05-17 NOTE — PROGRESS NOTES
Patient Identification:  Name:  Wilfredo Lau  Age:  36 y.o.  Sex:  male  :  1985  MRN:  [id number]  Visit Number:  [id number]  Primary Care Provider:  Provider, No Known    Length of stay:  4    Subjective/Interval History/Consultants/Procedures     Chief Complaint: abdominal pain     Subjective/Interval History:    36 y.o. male who was admitted on 2022 with large volume ascites from the [facility].       PMH is significant for Bipolar 1 disorder, PTSD, Alcohol use disorder with current alcohol withdraw, cirrhosis of the liver.   For complete admission information, please see history and physical.      Consultations:  1. Othopedic  2. General surgery     Procedures/Scans:  1. CT abomen and pelvis wo x2   2. Liver ultrasound  3. ECHO     The patient was started on  IV rocephin as well as Albumin for possible SBP. A CT of the abdomen and pelvis without contrast shows large volume ascites. However, the ascites appears to be perihepatic on ultrasound and is not drainable per paracentesis. He remains pancytopenic without overt signs or symptoms of bleeding. He also has persistent hypomagnesemia that is replaced as needed. He has has contained abdominal pain. General surgery was consulted and felt that is abdominal pain is possibly 2/2 to constipation. A Bowel regimen was ordered. The patient has reported regular bowel movements. Because of persistent abominal pain, his antibiotic coverage was expanded to vancomycin and cefepime. The patients remains on the CIWA protocol and is being treated with ativan. He has remained A&O with ativan control withdraw symptoms well. He does have moments of illogical thoughts according to nursing documentation.  There was evidence of AVN of the R hip noted on a CT, ortho was consulted, patient can follow up on an out patient basis. Hypertension document, started on aldactone for blood pressure control as well as portal hypertension in the setting of cirrhosis. He  was also started on lasix with excellent response.        The patient was resting in bed. He had just finished showering and cleaning up for the day at the time of my evaluation. He denies chest pain, shortness of breath, nausea, vomiting, diarrhea. Continues to state his bowels are moving regularly. He complains of abdominal pain again today. He exhibits much less pain/grimmacing with palpation today when compared to previous days.  I have spoke with MEGAN Pham she states that he is requesting his dilaudid every two hours. I suspect a component of drug seeking behavior is developing, however, will update abdominal imaging to rule out worsening abdominal processes.     He has remained A&O and pleasant with staff at this time. He remains hemodynamically stable. Will transfer to medical floor    MEGAN Pham notified me later this morning that the patient would like to resume his SSRI for depression. I reviewed documentation from his admission to the [facility], they started him on Prozac 20mg daily. This was not ordered on admission, I have ordered this to be resumed today.       ----------------------------------------------------------------------------------------------------------------------  Current Hospital Meds:  B-complex with vitamin C, 2 tablet, Oral, Daily  cefepime, 2 g, Intravenous, Q8H  docusate sodium, 100 mg, Oral, BID  folic acid, 1 mg, Oral, Daily  furosemide, 40 mg, Oral, Daily  losartan, 50 mg, Oral, Q24H  magnesium sulfate, 2 g, Intravenous, Q2H  nicotine, 1 patch, Transdermal, Q24H  polyethylene glycol, 17 g, Oral, Daily  sodium chloride, 10 mL, Intravenous, Q12H  sodium chloride, 10 mL, Intravenous, Q12H  thiamine, 100 mg, Oral, Daily  vancomycin, 1,000 mg, Intravenous, Q8H         ----------------------------------------------------------------------------------------------------------------------      Objective     Vital Signs:  Temp:  [98.1 °F (36.7 °C)-98.7 °F (37.1 °C)] 98.7 °F (37.1  °C)  Heart Rate:  [] 98  Resp:  [12-20] 14  BP: ()/() 128/83      05/15/22  0438 05/16/22  0500 05/17/22  0502   Weight: 99.2 kg (218 lb 9.6 oz) 101 kg (222 lb 14.4 oz) 99.2 kg (218 lb 12.8 oz)     Body mass index is 26.64 kg/m².    Intake/Output Summary (Last 24 hours) at 5/17/2022 0711  Last data filed at 5/17/2022 0509  Gross per 24 hour   Intake 3550 ml   Output 3450 ml   Net 100 ml     No intake/output data recorded.  Diet Regular; Daily Fluid Restriction; 1500 mL Fluid Per Day  ----------------------------------------------------------------------------------------------------------------------    Physical Exam    Vitals and nursing note reviewed.   Constitutional:       General: He is awake.      Appearance: He is normal weight.   HENT:      Head: Normocephalic and atraumatic.      Mouth/Throat:      Lips: Pink.      Mouth: Mucous membranes are moist.   Cardiovascular:      Rate and Rhythm: Regular rhythm. Tachycardia present.   Pulmonary:      Effort: Pulmonary effort is normal.      Breath sounds: Normal breath sounds and air entry.   Abdominal:      General: Abdomen is flat. Bowel sounds are normal.      Palpations: Abdomen is soft.      Tenderness: There is generalized abdominal tenderness.      Hernia: A hernia is present. Hernia is present in the umbilical area.   Musculoskeletal:      Right lower leg: No edema.      Left lower leg: No edema.   Skin:     General: Skin is warm and dry.      Capillary Refill: Capillary refill takes less than 2 seconds.   Neurological:      General: No focal deficit present.      Mental Status: He is alert and oriented to person, place, and time.   Psychiatric:         Attention and Perception: Attention normal.         Mood and Affect: Mood normal.         Behavior: Behavior is cooperative.     I have reviewed the above documentation and made changes as indicated to reflect today's findings.       ----------------------------------------------------------------------------------------------------------------------  Tele:        ----------------------------------------------------------------------------------------------------------------------      Results from last 7 days   Lab Units 05/17/22  0214 05/16/22  0930 05/15/22  1133 05/15/22  0643 05/14/22  0825 05/14/22  0025 05/13/22  1926 05/13/22  1627 05/13/22  0711 05/12/22  1048 05/12/22  0748 05/12/22  0650   CRP mg/dL  --   --   --   --   --  0.36  --  0.42  --   --   --  0.65*   LACTATE mmol/L  --   --   --   --  1.6  --  2.8*  --   --  1.3   < >  --    WBC 10*3/mm3 3.40 1.87* 2.26*   < >  --  1.44*  --   --    < >  --   --  1.72*   HEMOGLOBIN g/dL 11.4* 10.1* 10.2*   < >  --  10.7*  --   --    < >  --   --  12.0*   HEMATOCRIT % 35.2* 30.9* 31.2*   < >  --  32.9*  --   --    < >  --   --  36.6*   MCV fL 98.9* 98.7* 98.4*   < >  --  99.1*  --   --    < >  --   --  98.7*   MCHC g/dL 32.4 32.7 32.7   < >  --  32.5  --   --    < >  --   --  32.8   PLATELETS 10*3/mm3 74* 59* 62*   < >  --  50*  --   --    < >  --   --  46*   INR   --   --   --   --   --  1.83*  --   --   --   --   --  1.77*    < > = values in this interval not displayed.         Results from last 7 days   Lab Units 05/17/22  0214 05/16/22  0054 05/15/22  0139   SODIUM mmol/L 133* 137 135*   POTASSIUM mmol/L 3.9 4.0 4.0   MAGNESIUM mg/dL 1.4* 1.6 1.5*   CHLORIDE mmol/L 99 104 104   CO2 mmol/L 23.0 24.2 23.4   BUN mg/dL 8 6 6   CREATININE mg/dL 0.70* 0.59* 0.56*   CALCIUM mg/dL 9.5 8.9 8.7   GLUCOSE mg/dL 154* 107* 122*   ALBUMIN g/dL 4.17 4.09 3.41*   BILIRUBIN mg/dL 5.8* 4.1* 3.9*   ALK PHOS U/L 98 91 106   AST (SGOT) U/L 85* 73* 80*   ALT (SGPT) U/L 42* 31 33   Estimated Creatinine Clearance: 206.7 mL/min (A) (by C-G formula based on SCr of 0.7 mg/dL (L)).  No results found for: AMMONIA      Blood Culture   Date Value Ref Range Status   05/12/2022 No growth at 4 days  Preliminary    05/12/2022 No growth at 4 days  Preliminary     No results found for: URINECX  No results found for: WOUNDCX  No results found for: STOOLCX  ----------------------------------------------------------------------------------------------------------------------  Imaging Results (Last 24 Hours)     ** No results found for the last 24 hours. **        ----------------------------------------------------------------------------------------------------------------------   I have reviewed the above laboratory values for 05/17/22    Assessment/Plan     Active Hospital Problems    Diagnosis  POA   • Hepatic encephalopathy (HCC) [K72.90]  Yes         -sereve sepsis (WBC 1.44, HR >90 ,lactate 2.8)  -large volume ascites, will likely benefit from paracentesis   -high risk for SBP  -Chronic cirrhosis of the liver 2/2 alcohol abuse  -pancytopenia  -elevated liver enzymes  -elevated bilirubin  -abnormal coagulation studies  -possible upper abdominal varices on CT  Results of CT in HPI, large volume ascites. No drainable fluid. Lasix added with excellent response.   Expanded abx coverage to vancomycin and cefepime.    Acute hepatitis/HIV negative  Will add daily Protonix  CRP normal.   PT/INR elevated but stable.   No thrombus on Liver US.   General surgery consult, feels abd pain 2/2 to constipation. Bowel regimen was ordered. Patient having regular bowel movements  Pancytopenia remains stable.   Update abdominal CT due to continued abdominal pain.  CBC in AM      -hypomagnesemia  -hypokalemia  Will replace per protocol   CMP in AM  Magnesium 1.4 today, replace per protocol.      -alcohol abuse (last drink just prior to ED presentation)  -history of substance abuse  -depression  -suicidal ideation  [facility] protocol  Visible tremors of exam.  100mg  thiamine and 1mg folate.   Continued telemetry monitoring  Seizure precautions    -Depression  Patient requesting SSRI  He was started on prozac 20mg during his admission to [facility]. I have resumed this today.      -hypertension  Started aldactone for hypertension as well as portal htn in the setting of cirrhosis.   Addition of losartan   ECHO with mitral valve regurgitation and tricuspid regurgitation. EF 56-60%  Overall improved blood pressure control      -questionable avascular necrosis of the right femoral head  orthopedic consult for evaluation   Follow up outpatient      ----------  -DVT prophylaxis: Scds. Patient pancytopenic, will defer anticoagulation at this time  -Activity: as tolerated     -Disposition: patient is planning to return home to [place].      High risk secondary to ETOH abuse, substance abuse, cirrhosis, electrolyte imbalances     Code status: the patient is a FULL CODE      Maddi Kramer, APRN  05/17/22  07:11 EDT

## 2022-05-17 NOTE — CASE MANAGEMENT/SOCIAL WORK
"Discharge Planning Assessment   [place]     Patient Name: Wilfredo Lau  MRN: [id number]  Today's Date: 5/17/2022    Admit Date: 5/13/2022     Discharge Plan     Row Name 05/17/22 1442       Plan    Plan SS received additional consult for \"pt asking about etoh rehab upon discharge.\" [facility] [phone] and spoke with Bridget who states [facility]. Bridget also states pt will need to contact [facility] based on where he lives/is registered and that this is based on zip code. Pt will need to contact [facility] at [phone] and request to speak with a provider or  about resources. SS to provide clinic contact information to pt.              ALISON Benton    "

## 2022-05-17 NOTE — PROGRESS NOTES
Pharmacokinetics Service Note:    Mr. Lau continues on day 3 of 7 of vancomycin 1 gm q8hrs for his possible SBP.  A 6.75 hour post infusion level was reported as 9.1 mg/L today.  This correlates with an AUC ~ 392 mg/L.hr, which is slightly lower than the desired range of 400-600 mg/L.hr for this indication.  Will increase the dosage to 1.25 gm q8hrs to target an AUC closer to 500 mg/L.hr.  Will repeat a level in the next 48 hours if treatment is to continue.      Antimicrobial Length of Therapy:    Day 2 of 10 cefepime  Day 3 of 7 vancomycin IV    Thank you.  Trice Medina, Pharm.D.  5/17/2022  08:39 EDT

## 2022-05-18 LAB
ALBUMIN SERPL-MCNC: 3.61 G/DL (ref 3.5–5.2)
ALBUMIN/GLOB SERPL: 1.1 G/DL
ALP SERPL-CCNC: 94 U/L (ref 39–117)
ALT SERPL W P-5'-P-CCNC: 39 U/L (ref 1–41)
ANION GAP SERPL CALCULATED.3IONS-SCNC: 9.1 MMOL/L (ref 5–15)
AST SERPL-CCNC: 82 U/L (ref 1–40)
BASOPHILS # BLD AUTO: 0.01 10*3/MM3 (ref 0–0.2)
BASOPHILS NFR BLD AUTO: 0.4 % (ref 0–1.5)
BILIRUB SERPL-MCNC: 4.3 MG/DL (ref 0–1.2)
BUN SERPL-MCNC: 8 MG/DL (ref 6–20)
BUN/CREAT SERPL: 11.8 (ref 7–25)
CALCIUM SPEC-SCNC: 9.4 MG/DL (ref 8.6–10.5)
CHLORIDE SERPL-SCNC: 102 MMOL/L (ref 98–107)
CO2 SERPL-SCNC: 24.9 MMOL/L (ref 22–29)
CREAT SERPL-MCNC: 0.68 MG/DL (ref 0.76–1.27)
DEPRECATED RDW RBC AUTO: 62.5 FL (ref 37–54)
EGFRCR SERPLBLD CKD-EPI 2021: 124.3 ML/MIN/1.73
EOSINOPHIL # BLD AUTO: 0.08 10*3/MM3 (ref 0–0.4)
EOSINOPHIL NFR BLD AUTO: 3.2 % (ref 0.3–6.2)
ERYTHROCYTE [DISTWIDTH] IN BLOOD BY AUTOMATED COUNT: 17.2 % (ref 12.3–15.4)
GLOBULIN UR ELPH-MCNC: 3.3 GM/DL
GLUCOSE SERPL-MCNC: 110 MG/DL (ref 65–99)
HCT VFR BLD AUTO: 31.8 % (ref 37.5–51)
HGB BLD-MCNC: 10.4 G/DL (ref 13–17.7)
IMM GRANULOCYTES # BLD AUTO: 0.01 10*3/MM3 (ref 0–0.05)
IMM GRANULOCYTES NFR BLD AUTO: 0.4 % (ref 0–0.5)
LYMPHOCYTES # BLD AUTO: 0.34 10*3/MM3 (ref 0.7–3.1)
LYMPHOCYTES NFR BLD AUTO: 13.8 % (ref 19.6–45.3)
MAGNESIUM SERPL-MCNC: 1.6 MG/DL (ref 1.6–2.6)
MCH RBC QN AUTO: 32.4 PG (ref 26.6–33)
MCHC RBC AUTO-ENTMCNC: 32.7 G/DL (ref 31.5–35.7)
MCV RBC AUTO: 99.1 FL (ref 79–97)
MONOCYTES # BLD AUTO: 0.3 10*3/MM3 (ref 0.1–0.9)
MONOCYTES NFR BLD AUTO: 12.1 % (ref 5–12)
NEUTROPHILS NFR BLD AUTO: 1.73 10*3/MM3 (ref 1.7–7)
NEUTROPHILS NFR BLD AUTO: 70.1 % (ref 42.7–76)
NRBC BLD AUTO-RTO: 0 /100 WBC (ref 0–0.2)
PLATELET # BLD AUTO: 79 10*3/MM3 (ref 140–450)
PMV BLD AUTO: 10.9 FL (ref 6–12)
POTASSIUM SERPL-SCNC: 4.5 MMOL/L (ref 3.5–5.2)
PROT SERPL-MCNC: 6.9 G/DL (ref 6–8.5)
RBC # BLD AUTO: 3.21 10*6/MM3 (ref 4.14–5.8)
SODIUM SERPL-SCNC: 136 MMOL/L (ref 136–145)
WBC NRBC COR # BLD: 2.47 10*3/MM3 (ref 3.4–10.8)

## 2022-05-18 PROCEDURE — 99232 SBSQ HOSP IP/OBS MODERATE 35: CPT | Performed by: PHYSICIAN ASSISTANT

## 2022-05-18 PROCEDURE — 85025 COMPLETE CBC W/AUTO DIFF WBC: CPT

## 2022-05-18 PROCEDURE — 0 MAGNESIUM SULFATE 4 GM/100ML SOLUTION: Performed by: INTERNAL MEDICINE

## 2022-05-18 PROCEDURE — 99253 IP/OBS CNSLTJ NEW/EST LOW 45: CPT | Performed by: PSYCHIATRY & NEUROLOGY

## 2022-05-18 PROCEDURE — 80053 COMPREHEN METABOLIC PANEL: CPT

## 2022-05-18 PROCEDURE — 83735 ASSAY OF MAGNESIUM: CPT | Performed by: INTERNAL MEDICINE

## 2022-05-18 RX ORDER — TRAZODONE HYDROCHLORIDE 50 MG/1
50 TABLET ORAL NIGHTLY PRN
Status: DISCONTINUED | OUTPATIENT
Start: 2022-05-18 | End: 2022-05-20 | Stop reason: HOSPADM

## 2022-05-18 RX ORDER — MAGNESIUM SULFATE HEPTAHYDRATE 40 MG/ML
4 INJECTION, SOLUTION INTRAVENOUS ONCE
Status: COMPLETED | OUTPATIENT
Start: 2022-05-18 | End: 2022-05-18

## 2022-05-18 RX ORDER — HYDROXYZINE HYDROCHLORIDE 10 MG/1
10 TABLET, FILM COATED ORAL ONCE
Status: COMPLETED | OUTPATIENT
Start: 2022-05-18 | End: 2022-05-18

## 2022-05-18 RX ADMIN — Medication 100 MG: at 12:19

## 2022-05-18 RX ADMIN — FUROSEMIDE 40 MG: 40 TABLET ORAL at 10:07

## 2022-05-18 RX ADMIN — Medication 10 ML: at 20:16

## 2022-05-18 RX ADMIN — FOLIC ACID 1 MG: 1 TABLET ORAL at 10:08

## 2022-05-18 RX ADMIN — HYDROXYZINE HYDROCHLORIDE 10 MG: 10 TABLET ORAL at 05:04

## 2022-05-18 RX ADMIN — Medication 10 ML: at 10:10

## 2022-05-18 RX ADMIN — HYDROCODONE BITARTRATE AND ACETAMINOPHEN 1 TABLET: 5; 325 TABLET ORAL at 14:44

## 2022-05-18 RX ADMIN — FLUOXETINE HYDROCHLORIDE 20 MG: 20 CAPSULE ORAL at 10:08

## 2022-05-18 RX ADMIN — Medication 10 ML: at 10:08

## 2022-05-18 RX ADMIN — Medication 2 TABLET: at 10:08

## 2022-05-18 RX ADMIN — NICOTINE TRANSDERMAL SYSTEM 1 PATCH: 21 PATCH, EXTENDED RELEASE TRANSDERMAL at 10:09

## 2022-05-18 RX ADMIN — LACTULOSE 20 G: 10 SOLUTION ORAL at 16:41

## 2022-05-18 RX ADMIN — DOCUSATE SODIUM 50 MG AND SENNOSIDES 8.6 MG 1 TABLET: 8.6; 5 TABLET, FILM COATED ORAL at 20:16

## 2022-05-18 RX ADMIN — LOSARTAN POTASSIUM 50 MG: 50 TABLET, FILM COATED ORAL at 10:08

## 2022-05-18 RX ADMIN — MAGNESIUM SULFATE HEPTAHYDRATE 4 G: 40 INJECTION, SOLUTION INTRAVENOUS at 10:10

## 2022-05-18 RX ADMIN — HYDROCODONE BITARTRATE AND ACETAMINOPHEN 1 TABLET: 5; 325 TABLET ORAL at 05:04

## 2022-05-18 RX ADMIN — LACTULOSE 20 G: 10 SOLUTION ORAL at 10:07

## 2022-05-18 RX ADMIN — DOCUSATE SODIUM 50 MG AND SENNOSIDES 8.6 MG 1 TABLET: 8.6; 5 TABLET, FILM COATED ORAL at 10:07

## 2022-05-18 RX ADMIN — LACTULOSE 20 G: 10 SOLUTION ORAL at 20:16

## 2022-05-18 RX ADMIN — TRAZODONE HYDROCHLORIDE 50 MG: 50 TABLET ORAL at 20:15

## 2022-05-18 NOTE — PROGRESS NOTES
[facility] HOSPITALIST PROGRESS NOTE     Patient Identification:  Name:  Wilfredo Lau  Age:  36 y.o.  Sex:  male  :  1985  MRN:  [id number]  Visit Number:  [id number]  Primary Care Provider:  Provider, No Known    Date of admission: 2022  Length of stay:  5    ----------------------------------------------------------------------------------------------------------------------  Subjective     Chief Complaint: Abdominal pain.     Subjective/Interval History:    36 y.o. male who was admitted on 2022 from the detox unit of the [facility].  He had been admitted there on 2022 for voluntary alcohol detox and suicidal ideation.  He had reported drinking 1-2 pints of vodka daily as well as using illicit fentanyl approximately 3 months ago.  He is from [place] and had been staying in a local hotel.  Medical services were consulted on admission to [facility] for pancytopenia and cirrohsis of the liver. The patient developed increased abdominal pain.  A CT scan of the abdomen pelvis was obtained and showed Cirrhotic liver contours with findings consistent with sequelae of portal hypertension including splenomegaly, large volume ascites and probable upper abdominal varices. Serpiginous hyperdensities at the level of the superior right femoral head are findings worrisome for avascular necrosis. Given his abdominal pain and high risk for SBP, he was transferred to the PCU under medicine services. For complete admission information, please see history and physical.     The patient was admitted to the PCU.  He was started on high dose Rocephin 2 g daily. He was placed on IV albumin.  He was placed on CIWA protocol and restarted on Ativan and clonidine regimen which he had been receiving for detox protocol at the [facility].  He was noted to have a systolic heart murmur and therefore had a transthoracic echocardiogram.  This showed an EF of 56 to 60% with grade 1  "diastolic dysfunction.  Mild mitral and tricuspid regurgitation appreciated.  Plan was for paracentesis, however, on ultrasound, the ascites was reported to be perihepatic and not drainable via paracentesis.     General surgery was consulted and felt that his abdominal pain is possibly 2/2 constipation. A bowel regimen was ordered and his bowels have been moving regularly. Because of persistent abominal pain, his antibiotic coverage was expanded to vancomycin and cefepime. He has remained A&O with ativan control withdraw symptoms well. He does have moments of illogical thoughts according to nursing documentation.  There was evidence of AVN of the R hip noted on a CT, ortho was consulted, patient can follow up on an out patient basis. Hypertension documented, started on aldactone for blood pressure control as well as portal hypertension in the setting of cirrhosis. He was also started on lasix and losartan.     Consultations:  General surgery  Orthopedic surgery    Procedures/Scans:  US Abdomen Limited   CT abdomen/pelvis without contrast x 3  Liver ultrasound  Transthoracic echocardiogram    Today, the patient reports that he is doing fairly well.  Denies significant abdominal pain.  His bowels have been moving.  He has had a good appetite and has tolerated oral intake.  Discussed with RNDayana, present at bedside.  Denies any new events or concerns other than the patient is intermittently tearful.  I did discuss discharge planning with the patient.  He is unsure where he will go at discharge as he was living in a local hotel.  He states that if he goes back to the hotel, he will \"drink himself to death.\" No reports of suicidal ideation.     Review of Systems   Constitutional: Negative for chills and fever.   Respiratory: Negative for cough and shortness of breath.    Cardiovascular: Negative for chest pain.   Gastrointestinal: Positive for abdominal distention, abdominal pain (Better. ) and constipation (Better. " ). Negative for diarrhea, nausea and vomiting.   Genitourinary: Negative for difficulty urinating and dysuria.   Psychiatric/Behavioral: Negative for agitation and behavioral problems.        Intermittently tearful.       Present during exam: Dayana GUZMAN.   ----------------------------------------------------------------------------------------------------------------------  Objective   Current [facility] Meds:  B-complex with vitamin C, 2 tablet, Oral, Daily  FLUoxetine, 20 mg, Oral, Daily  folic acid, 1 mg, Oral, Daily  furosemide, 40 mg, Oral, Daily  lactulose, 20 g, Oral, TID  losartan, 50 mg, Oral, Q24H  magnesium sulfate, 4 g, Intravenous, Once  nicotine, 1 patch, Transdermal, Q24H  senna-docusate sodium, 1 tablet, Oral, BID  sodium chloride, 10 mL, Intravenous, Q12H  sodium chloride, 10 mL, Intravenous, Q12H  thiamine, 100 mg, Oral, Daily         ----------------------------------------------------------------------------------------------------------------------  Vital Signs:  Temp:  [97.4 °F (36.3 °C)-98.4 °F (36.9 °C)] 98.3 °F (36.8 °C)  Heart Rate:  [] 74  Resp:  [8-22] 19  BP: (101-141)/(56-82) 141/71  Mean Arterial Pressure (Non-Invasive) for the past 24 hrs (Last 3 readings):   Noninvasive MAP (mmHg)   05/18/22 1029 93   05/18/22 0610 87   05/18/22 0146 89     SpO2 Percentage    05/18/22 0146 05/18/22 0610 05/18/22 1029   SpO2: 99% 90% 99%     SpO2:  [90 %-100 %] 99 %  on   ;   Device (Oxygen Therapy): room air    Body mass index is 26.64 kg/m².  Wt Readings from Last 3 Encounters:   05/17/22 99.2 kg (218 lb 12.8 oz)   05/12/22 98.3 kg (216 lb 12.8 oz)   05/12/22 101 kg (222 lb 12.8 oz)        Intake/Output Summary (Last 24 hours) at 5/18/2022 1113  Last data filed at 5/18/2022 0900  Gross per 24 hour   Intake 1312 ml   Output 2000 ml   Net -688 ml     Diet Regular; Daily Fluid Restriction; 1500 mL Fluid Per  Day  ----------------------------------------------------------------------------------------------------------------------  Physical exam:  Constitutional: Vital signs reviewed. Well-developed and well-nourished.  No respiratory distress.      HENT:  Head:  Normocephalic and atraumatic.  Mouth:  Moist mucous membranes.    Eyes:  Conjunctivae and EOM are normal. No scleral icterus. No erythema or drainage.  Neck:  Neck supple.  No carotid bruits noted.  Cardiovascular:  Normal rate, regular rhythm and normal heart sounds with no murmur.  Pulmonary/Chest:  No respiratory distress, no wheezes, no crackles, with normal breath sounds and good air movement.   Abdominal:  Soft.  Bowel sounds are present x4. Non-tender. No significant distention noted.   Musculoskeletal:  No edema, no tenderness, and no deformity.  No red or swollen joints anywhere.    Neurological:  Alert and oriented to person, place, and time. No facial droop.  No slurred speech.   Skin:  Skin is warm and dry. No rash noted. No pallor.   Peripheral vascular: No clubbing, no cyanosis, no edema. 2+ pedal pulses bilaterally.   Genitourinary: No cordova catheter in place.     I have reviewed and updated the physical exam as needed to reflect that of 05/18/22  ----------------------------------------------------------------------------------------------------------------------  Tele:  SR in the 70s-80s.    ----------------------------------------------------------------------------------------------------------------------            Results from last 7 days   Lab Units 05/18/22  0050 05/17/22  0214 05/16/22  0930 05/15/22  0643 05/14/22  0825 05/14/22  0025 05/13/22  1926 05/13/22  1627 05/13/22  0711 05/12/22  1048 05/12/22  0748 05/12/22  0650   CRP mg/dL  --   --   --   --   --  0.36  --  0.42  --   --   --  0.65*   LACTATE mmol/L  --   --   --   --  1.6  --  2.8*  --   --  1.3   < >  --    WBC 10*3/mm3 2.47* 3.40 1.87*   < >  --  1.44*  --   --    < >  --    --  1.72*   HEMOGLOBIN g/dL 10.4* 11.4* 10.1*   < >  --  10.7*  --   --    < >  --   --  12.0*   HEMATOCRIT % 31.8* 35.2* 30.9*   < >  --  32.9*  --   --    < >  --   --  36.6*   MCV fL 99.1* 98.9* 98.7*   < >  --  99.1*  --   --    < >  --   --  98.7*   MCHC g/dL 32.7 32.4 32.7   < >  --  32.5  --   --    < >  --   --  32.8   PLATELETS 10*3/mm3 79* 74* 59*   < >  --  50*  --   --    < >  --   --  46*   INR   --   --   --   --   --  1.83*  --   --   --   --   --  1.77*    < > = values in this interval not displayed.     Results from last 7 days   Lab Units 05/18/22  0050 05/17/22  0214 05/16/22  0054   SODIUM mmol/L 136 133* 137   POTASSIUM mmol/L 4.5 3.9 4.0   MAGNESIUM mg/dL 1.6 1.4* 1.6   CHLORIDE mmol/L 102 99 104   CO2 mmol/L 24.9 23.0 24.2   BUN mg/dL 8 8 6   CREATININE mg/dL 0.68* 0.70* 0.59*   CALCIUM mg/dL 9.4 9.5 8.9   GLUCOSE mg/dL 110* 154* 107*   ALBUMIN g/dL 3.61 4.17 4.09   BILIRUBIN mg/dL 4.3* 5.8* 4.1*   ALK PHOS U/L 94 98 91   AST (SGOT) U/L 82* 85* 73*   ALT (SGPT) U/L 39 42* 31   Estimated Creatinine Clearance: 212.7 mL/min (A) (by C-G formula based on SCr of 0.68 mg/dL (L)).  No results found for: AMMONIA    No results found for: HGBA1C, POCGLU  No results found for: HGBA1C  No results found for: TSH, FREET4    Blood Culture   Date Value Ref Range Status   05/12/2022 No growth at 5 days  Final   05/12/2022 No growth at 5 days  Final     No results found for: URINECX  No results found for: WOUNDCX  No results found for: STOOLCX  No results found for: RESPCX  Pain Management Panel     Pain Management Panel Latest Ref Rng & Units 5/12/2022    AMPHETAMINES SCREEN, URINE Negative Negative    BARBITURATES SCREEN Negative Negative    BENZODIAZEPINE SCREEN, URINE Negative Positive(A)    BUPRENORPHINEUR Negative Negative    COCAINE SCREEN, URINE Negative Negative    METHADONE SCREEN, URINE Negative Negative    METHAMPHETAMINEUR Negative Negative        I have personally reviewed the above laboratory  results for 05/18/22  ----------------------------------------------------------------------------------------------------------------------  Imaging Results (Last 24 Hours)     Procedure Component Value Units Date/Time    CT Abdomen Pelvis Without Contrast [[id number]] Collected: 05/17/22 1340     Updated: 05/17/22 1348    Narrative:      EXAM: CT ABDOMEN PELVIS WO CONTRAST-         TECHNIQUE: Multiple axial CT images were obtained from lung bases  through pubic symphysis without administration of IV contrast.  Reformatted images in the coronal and/or sagittal plane(s) were  generated from the axial data set to facilitate diagnostic accuracy  and/or surgical planning.  Oral Contrast:NONE.     Radiation dose reduction techniques were utilized per ALARA protocol.  Automated exposure control was initiated through either or CareDoDeehubs or  DoseRight software packages by  protocol.    DOSE: 669.44 mGy.cm     Clinical information Abdominal pain, acute, nonlocalized      Comparison 05/15/2022     FINDINGS:     Lower thorax: Clear. No effusions.     Abdomen:     Liver: Somewhat nodular appearance of the liver.     Gallbladder: Not visualized     Pancreas: Unremarkable. No mass or ductal dilatation.     Spleen: Enlarged     Adrenals: No mass.     Kidneys/ureters: No mass. No obstructive uropathy.  No evidence of  urolithiasis.     GI tract: Moderate to large stool burden.. There is no evidence of  appendicitis     MESENTERY: Small volume ascites     Vasculature: No evidence of aneurysm.     Abdominal wall: Umbilical hernia but no evidence of incarceration     Bladder: No focal mass or significant wall thickening     Reproductive: Unremarkable as visualized     Bones: No acute bony abnormality.       Impression:         1. Cirrhotic appearance of the liver with small volume ascites and  splenomegaly.  2.Moderate to large volume stool.  3. Umbilical hernia containing fluid but no evidence of incarceration.  4. Other  "findings as above.                    This report was finalized on 5/17/2022 1:46 PM by Dr. Elmer Phipps MD.               ----------------------------------------------------------------------------------------------------------------------  Assessment/Plan     -SIRS (WBC 1.44, HR >90, lactate 2.8)  -Large volume ascites   -Cirrhosis of the liver 2/2 alcohol abuse  -Elevated liver enzymes  -Hyperbilirubinemia  -Possible upper abdominal varices on CT  No drainable fluid appreciated on US. Lasix added with excellent response and improvement in appearance of ascites on repeat CT. On 1500mL fluid restriction.   IV antibiotics discontinued 5/17 as there is no findings to suggest infection at this time.   Acute hepatitis/HIV negative.  No evidence of portal vein thrombus on Liver US.   General surgery consulted, feels abd pain 2/2 to constipation. Bowel regimen was ordered. Patient having regular bowel movements.  LFTs stable. T-Bili improving.   CBC in AM     -Pancytopenia   -Abnormal coagulation studies    [place] to be secondary to alcohol abuse, liver cirrhosis.  No bleeding appreciated.  Platelets up to 79k today.  H/H stable.  ANC 1.73.  Repeat CBC in AM.    -Acute hypomagnesemia  -Acute hypokalemia  Replace electrolytes per protocol as needed.  CMP in AM    -Alcohol abuse  -Substance abuse  [facility] protocol in place.  Supplement thiamine and folate.   Continued telemetry monitoring  Seizure precautions.     -Depression  Recently started on prozac 20mg during his admission to [facility], resumed.   Per RN, patient remains intermittently tearful at times.  He reports that if he goes back to the hotel room which he was staying prior to admission, he will just \"drink himself to death.\"  No reports of SI. We will consult psychiatry to continue to follow the patient, appreciate their input.     -Essential hypertension  Started aldactone for hypertension as well as portal htn in the setting of cirrhosis.   Also started " on furosemide and losartan.  Improvement in BP noted.  ECHO with mitral valve regurgitation and tricuspid regurgitation. EF 56-60%.      -Questionable avascular necrosis of the right femoral head  Orthopedic consulted.  Follow up outpatient     -DVT Prophylaxis: SCDS 2/2 pancytopenia.   -F/E/N: No IVF. Replace electrolytes PRN.   Dietary Orders (From admission, onward)     Start     Ordered    05/16/22 1903  Diet Regular; Daily Fluid Restriction; 1500 mL Fluid Per Day  Diet Effective Now        Question Answer Comment   Diet Texture / Consistency Regular    Common Modifiers Daily Fluid Restriction    Fluid Restriction Per Day: 1500 mL Fluid Per Day        05/16/22 1902              Chronic Medical Problems:    Tobacco abuse: Encourage cessation. Continue NRT.    The patient is high risk due to the following diagnoses/reasons: Cirrhosis, ascites, electrolyte abnormalities, alcohol abuse, substance abuse, depression.    I have discussed the patient's assessment and plan with the patient, MEGAN Anne, and attending physician, Dr. Hughes, Anna Marie Macias,      Disposition: SS assisting with discharge planning.  May need to go to rehab.    Discharge needs:  Repeat CBC/CMP outpatient.     Citlalli Rosen PA-C  05/18/22  11:13 EDT

## 2022-05-18 NOTE — CASE MANAGEMENT/SOCIAL WORK
Discharge Planning Assessment   [facility]     Patient Name: Wilfredo Lau  MRN: [id number]  Today's Date: 5/18/2022    Admit Date: 5/13/2022     Discharge Plan     Row Name 05/18/22 0978       Plan    Plan SS [facility] Ashish Pérez at [phone] who is agreeable to complete an interview with pt for possible admit.  Pt is aware and agreeable.  SS notified Physician.  SS will follow.    Row Name 05/18/22 0137       Plan    Plan SS spoke with pt regarding options. Pt stated that he would be agreeable to [facility] [address] if bed available.  SS will follow.    Row Name 05/18/22 7884       Plan    Plan SS discussed discharge options with Exec Director Judi and Physician.  SS will follow.    Row Name 05/18/22 7369       Plan    Plan SS received consult per Physician for placement in a treatment facility.  SS spoke with pt at bedside.  [facility] [place] rather than at [place] in [place].  SS spoke with [facility] at [phone] who recommended that SS call  Jerome Estrada at [phone] to determine how to coordinate placement in a [facility] treatment facility in [place] with [facility].  Pt aware and agreeable to SS contacting [facility] .  SS will follow.                MURRAY Krishna

## 2022-05-18 NOTE — CASE MANAGEMENT/SOCIAL WORK
Discharge Planning Assessment   [facility]     Patient Name: Wilfredo Lau  MRN: [id number]  Today's Date: 5/18/2022    Admit Date: 5/13/2022       Discharge Plan     Row Name 05/18/22 6999       Plan    Plan SS spoke with pt regarding options. Pt stated that he would be agreeable to [address] [address] if bed available.  SS will follow.    Row Name 05/18/22 1712       Plan    Plan SS discussed discharge options with Exec Director Judi and Physician.  SS will follow.    Row Name 05/18/22 2778       Plan    Plan SS received consult per Physician for placement in a treatment facility.  SS spoke with pt at bedside.  Pt requests placement in a [facility] treatment facility in [place] rather than at [place] in [place].  SS spoke with [facility] at [phone] who recommended that SS call  Jerome Estrada at [phone] to determine how to coordinate placement in a [facility] treatment facility in [place] with [facility].  Pt aware and agreeable to SS contacting VA .  SS will follow.                  MURRAY Krishna

## 2022-05-18 NOTE — PLAN OF CARE
Goal Outcome Evaluation:      Patient has rested today. He was tearful and anxious at the beginning of the shift. Afraid to go to sleep because he may not wake up. I did reassure him that he is on a tele-monitor and being watched and it was ok to rest. He is worried about the plans of discharge.  is following.

## 2022-05-18 NOTE — CASE MANAGEMENT/SOCIAL WORK
Discharge Planning Assessment   [facility]     Patient Name: Wilfredo Lau  MRN: [id number]  Today's Date: 5/18/2022    Admit Date: 5/13/2022       Discharge Plan     Row Name 05/18/22 1628       Plan    Plan SS received consult per Physician for placement in a treatment facility.  SS spoke with pt at bedside.  Pt requests placement in a [facility] treatment facility in [place] rather than at [place] in [place].  SS spoke with [facility] at [phone] who recommended that SS call  Jerome Estrada at [phone] to determine how to coordinate placement in a [place] treatment facility in [place] with [facility].  Pt aware and agreeable to SS contacting VA .  SS will follow.                    ROBERTA KrishnaW

## 2022-05-18 NOTE — CONSULTS
Referring Provider: AURELIA Rosen  Reason for Consultation: mood    Chief complaint/Focus of Exam: mood    Subjective .     History of present illness:    Patient is a 36-year-old male with a history of cirrhosis and alcohol abuse who was transferred to the medical service from the psychiatric service due to complications from cirrhosis.  Psychiatry consulted for concerns of depression today.  Patient seen in room with nurse present for a portion of the exam.    Patient appears to be more stable than initially seen in the psychiatric hospital.  Mild intermittent confusion, likely related to underlying medical comorbidities, but overall doing well.  Intermittently tearful, per nursing reports.  Discussed with patient today and these expressions appear appropriate, as patient became mildly emotional today talking about his desire to quit drinking, saying that he promised his father and grandmother that he would get sober and get his life back.  Tolerating fluoxetine at this time.  Reports intermittent issues with insomnia and pain.  Otherwise, patient continues to be motivated to attend substance abuse rehab following medical stabilization.  No reported SI, HI or AVH.    Review of Systems  Pertinent items are noted in HPI, all other systems reviewed and negative    History  Past Medical History:   Diagnosis Date   • Alcohol abuse    • Cirrhosis (HCC)    • HTN (hypertension)    • Liver disease    • Psychosis (HCC)    • Substance abuse (HCC)    • Withdrawal symptoms, alcohol (HCC)    • Withdrawal symptoms, drug or narcotic (HCC)    , No past surgical history on file., No family history on file.,   Social History     Socioeconomic History   • Marital status: Single   Tobacco Use   • Smoking status: Current Every Day Smoker     Packs/day: 1.00     Types: Cigarettes   • Smokeless tobacco: Never Used   Vaping Use   • Vaping Use: Never used   Substance and Sexual Activity   • Alcohol use: Yes     Alcohol/week: 30.0 standard  drinks     Types: 30 Drinks containing 0.5 oz of alcohol per week     Comment: 2 to 3 pints a day   • Sexual activity: Not Currently     Partners: Female     Birth control/protection: None     E-cigarette/Vaping   • E-cigarette/Vaping Use Never User      E-cigarette/Vaping Substances     E-cigarette/Vaping Devices       ,   No medications prior to admission.   , Scheduled Meds:  B-complex with vitamin C, 2 tablet, Oral, Daily  FLUoxetine, 20 mg, Oral, Daily  folic acid, 1 mg, Oral, Daily  furosemide, 40 mg, Oral, Daily  lactulose, 20 g, Oral, TID  losartan, 50 mg, Oral, Q24H  nicotine, 1 patch, Transdermal, Q24H  senna-docusate sodium, 1 tablet, Oral, BID  sodium chloride, 10 mL, Intravenous, Q12H  sodium chloride, 10 mL, Intravenous, Q12H  thiamine, 100 mg, Oral, Daily    , Continuous Infusions:   , PRN Meds:  HYDROcodone-acetaminophen  •  magnesium sulfate **OR** magnesium sulfate **OR** magnesium sulfate  •  potassium chloride **OR** potassium chloride **OR** potassium chloride  •  sodium chloride  •  sodium chloride and Allergies:  Penicillins    Objective     Vital Signs   Temp:  [97.4 °F (36.3 °C)-98.4 °F (36.9 °C)] 98.1 °F (36.7 °C)  Heart Rate:  [] 82  Resp:  [14-22] 18  BP: (101-141)/(56-82) 127/71    Mental Status Exam:  Hygiene:   good  Cooperation:  Cooperative  Eye Contact:  Fair  Psychomotor Behavior:  Appropriate  Affect:  Full range and Appropriate  Hopelessness: Denies  Speech:  Normal  Thought Progress:  Goal directed and Linear  Thought Content:  Normal and Mood congruent  Suicidal:  None  Homicidal:  None  Hallucinations:  None  Delusion:  None  Memory:  Intact  Orientation:  Person, Place, Time and Situation  Reliability:  good  Insight:  Fair  Judgement:  Fair  Impulse Control:  Fair    Results Review:   I reviewed the patient's new clinical results.  I reviewed the patient's other test results and agree with the interpretation  I personally viewed and interpreted the patient's  EKG/Telemetry data  Lab Results (last 24 hours)     Procedure Component Value Units Date/Time    Comprehensive Metabolic Panel [[id number]]  (Abnormal) Collected: 05/18/22 0050    Specimen: Blood Updated: 05/18/22 0142     Glucose 110 mg/dL      BUN 8 mg/dL      Creatinine 0.68 mg/dL      Sodium 136 mmol/L      Potassium 4.5 mmol/L      Chloride 102 mmol/L      CO2 24.9 mmol/L      Calcium 9.4 mg/dL      Total Protein 6.9 g/dL      Albumin 3.61 g/dL      ALT (SGPT) 39 U/L      AST (SGOT) 82 U/L      Alkaline Phosphatase 94 U/L      Total Bilirubin 4.3 mg/dL      Globulin 3.3 gm/dL      A/G Ratio 1.1 g/dL      BUN/Creatinine Ratio 11.8     Anion Gap 9.1 mmol/L      eGFR 124.3 mL/min/1.73      Comment: National Kidney Foundation and American Society of Nephrology (ASN) Task Force recommended calculation based on the Chronic Kidney Disease Epidemiology Collaboration (CKD-EPI) equation refit without adjustment for race.       Narrative:      GFR Normal >60  Chronic Kidney Disease <60  Kidney Failure <15      Magnesium [[id number]]  (Normal) Collected: 05/18/22 0050    Specimen: Blood Updated: 05/18/22 0142     Magnesium 1.6 mg/dL     CBC & Differential [[id number]]  (Abnormal) Collected: 05/18/22 0050    Specimen: Blood Updated: 05/18/22 0118    Narrative:      The following orders were created for panel order CBC & Differential.  Procedure                               Abnormality         Status                     ---------                               -----------         ------                     CBC Auto Differential[[id number]]        Abnormal            Final result                 Please view results for these tests on the individual orders.    CBC Auto Differential [[id number]]  (Abnormal) Collected: 05/18/22 0050    Specimen: Blood Updated: 05/18/22 0118     WBC 2.47 10*3/mm3      RBC 3.21 10*6/mm3      Hemoglobin 10.4 g/dL      Hematocrit 31.8 %      MCV 99.1 fL      MCH 32.4 pg      MCHC 32.7 g/dL      RDW 17.2  %      RDW-SD 62.5 fl      MPV 10.9 fL      Platelets 79 10*3/mm3      Neutrophil % 70.1 %      Lymphocyte % 13.8 %      Monocyte % 12.1 %      Eosinophil % 3.2 %      Basophil % 0.4 %      Immature Grans % 0.4 %      Neutrophils, Absolute 1.73 10*3/mm3      Lymphocytes, Absolute 0.34 10*3/mm3      Monocytes, Absolute 0.30 10*3/mm3      Eosinophils, Absolute 0.08 10*3/mm3      Basophils, Absolute 0.01 10*3/mm3      Immature Grans, Absolute 0.01 10*3/mm3      nRBC 0.0 /100 WBC         Imaging Results (Last 24 Hours)     ** No results found for the last 24 hours. **            Assessment & Plan     Principal Problem:    Hepatic encephalopathy (HCC)     Major depressive disorder, moderate, recurrent  -Continue fluoxetine 20 mg daily  -Begin trazodone 50 mg nightly as needed for insomnia    Posttraumatic stress disorder, acute on chronic  -Medication as above  -Outpatient care as above, including trauma focused therapy     Alcohol use disorder, severe, dependence  -Patient interested in substance abuse rehab.  Patient with VA benefits and VA generally facilitates rehab services on their own.   -If the VA is unable to accommodate patient's substance abuse treatment themselves, multiple other local options are listed below and can be considered.    [facility] (all facilities) ph: [phone] fax: [phone]...[phone]   [facility] (all facilities) ph: [phone] fax: [phone]  [facility] ph: [phone] fax: [phone]   [facility] [place] ph: [phone] & [phone] fax: [phone]   [facility] [place] ph: [phone] fax: [phone]   [facility] [place] ph: [phone] fax: [phone]   [facility]: [phone] fax: [phone]   [facility] ph: [phone] fax: [phone]   [facility] ph: [phone] fax: [phone]   [facility] Triana ph: [phone] fax: [phone]    I discussed the patients findings and my recommendations with patient    Nikko SILVEIRA  MD Ming  05/18/22  14:39 EDT

## 2022-05-18 NOTE — PLAN OF CARE
Goal Outcome Evaluation:      Patient was a transfer from PCU [facility]. He has no complaints of chest pain or shortness of breath. Vital signs are stable. Will continue to monitor and follow plan of care.

## 2022-05-18 NOTE — CASE MANAGEMENT/SOCIAL WORK
Discharge Planning Assessment  JODIE Marte     Patient Name: Wilfredo Lau  MRN: [id number]  Today's Date: 5/18/2022    Admit Date: 5/13/2022          Discharge Plan     Row Name 05/18/22 1712       Plan    Plan SS discussed discharge options with Exec Director Judi and Physician.  SS will follow.    Row Name 05/18/22 4560       Plan    Plan SS received consult per Physician for placement in a treatment facility.  SS spoke with pt at bedside.  Pt requests placement in a [facility] treatment facility in [place] rather than at [place] in [place].  SS spoke with Katz Wilkin at [phone] who recommended that SS call  Jerome Estrada at [phone] to determine how to coordinate placement in a [facility] treatment facility in [place] with [facility].  Pt aware and agreeable to SS contacting VA .  SS will follow.              MURRAY Krishna

## 2022-05-19 LAB
ALBUMIN SERPL-MCNC: 3.6 G/DL (ref 3.5–5.2)
ALBUMIN/GLOB SERPL: 1 G/DL
ALP SERPL-CCNC: 99 U/L (ref 39–117)
ALT SERPL W P-5'-P-CCNC: 44 U/L (ref 1–41)
ANION GAP SERPL CALCULATED.3IONS-SCNC: 7.5 MMOL/L (ref 5–15)
AST SERPL-CCNC: 105 U/L (ref 1–40)
BASOPHILS # BLD AUTO: 0.02 10*3/MM3 (ref 0–0.2)
BASOPHILS NFR BLD AUTO: 0.7 % (ref 0–1.5)
BILIRUB SERPL-MCNC: 4.3 MG/DL (ref 0–1.2)
BUN SERPL-MCNC: 13 MG/DL (ref 6–20)
BUN/CREAT SERPL: 22 (ref 7–25)
CALCIUM SPEC-SCNC: 9.2 MG/DL (ref 8.6–10.5)
CHLORIDE SERPL-SCNC: 105 MMOL/L (ref 98–107)
CO2 SERPL-SCNC: 23.5 MMOL/L (ref 22–29)
CREAT SERPL-MCNC: 0.59 MG/DL (ref 0.76–1.27)
DEPRECATED RDW RBC AUTO: 63.2 FL (ref 37–54)
EGFRCR SERPLBLD CKD-EPI 2021: 129.8 ML/MIN/1.73
EOSINOPHIL # BLD AUTO: 0.13 10*3/MM3 (ref 0–0.4)
EOSINOPHIL NFR BLD AUTO: 4.8 % (ref 0.3–6.2)
ERYTHROCYTE [DISTWIDTH] IN BLOOD BY AUTOMATED COUNT: 17.2 % (ref 12.3–15.4)
GLOBULIN UR ELPH-MCNC: 3.6 GM/DL
GLUCOSE SERPL-MCNC: 117 MG/DL (ref 65–99)
HCT VFR BLD AUTO: 33.4 % (ref 37.5–51)
HGB BLD-MCNC: 10.8 G/DL (ref 13–17.7)
IMM GRANULOCYTES # BLD AUTO: 0.01 10*3/MM3 (ref 0–0.05)
IMM GRANULOCYTES NFR BLD AUTO: 0.4 % (ref 0–0.5)
LYMPHOCYTES # BLD AUTO: 0.38 10*3/MM3 (ref 0.7–3.1)
LYMPHOCYTES NFR BLD AUTO: 14 % (ref 19.6–45.3)
MAGNESIUM SERPL-MCNC: 1.7 MG/DL (ref 1.6–2.6)
MCH RBC QN AUTO: 32.2 PG (ref 26.6–33)
MCHC RBC AUTO-ENTMCNC: 32.3 G/DL (ref 31.5–35.7)
MCV RBC AUTO: 99.7 FL (ref 79–97)
MONOCYTES # BLD AUTO: 0.31 10*3/MM3 (ref 0.1–0.9)
MONOCYTES NFR BLD AUTO: 11.4 % (ref 5–12)
NEUTROPHILS NFR BLD AUTO: 1.87 10*3/MM3 (ref 1.7–7)
NEUTROPHILS NFR BLD AUTO: 68.7 % (ref 42.7–76)
NRBC BLD AUTO-RTO: 0 /100 WBC (ref 0–0.2)
PLATELET # BLD AUTO: 85 10*3/MM3 (ref 140–450)
PMV BLD AUTO: 10.9 FL (ref 6–12)
POTASSIUM SERPL-SCNC: 4.4 MMOL/L (ref 3.5–5.2)
PROT SERPL-MCNC: 7.2 G/DL (ref 6–8.5)
RBC # BLD AUTO: 3.35 10*6/MM3 (ref 4.14–5.8)
SODIUM SERPL-SCNC: 136 MMOL/L (ref 136–145)
WBC NRBC COR # BLD: 2.72 10*3/MM3 (ref 3.4–10.8)

## 2022-05-19 PROCEDURE — 82248 BILIRUBIN DIRECT: CPT | Performed by: PHYSICIAN ASSISTANT

## 2022-05-19 PROCEDURE — 80053 COMPREHEN METABOLIC PANEL: CPT | Performed by: PHYSICIAN ASSISTANT

## 2022-05-19 PROCEDURE — 99232 SBSQ HOSP IP/OBS MODERATE 35: CPT | Performed by: PHYSICIAN ASSISTANT

## 2022-05-19 PROCEDURE — 0 MAGNESIUM SULFATE 4 GM/100ML SOLUTION: Performed by: INTERNAL MEDICINE

## 2022-05-19 PROCEDURE — 82607 VITAMIN B-12: CPT | Performed by: PHYSICIAN ASSISTANT

## 2022-05-19 PROCEDURE — 83735 ASSAY OF MAGNESIUM: CPT | Performed by: PHYSICIAN ASSISTANT

## 2022-05-19 PROCEDURE — 85025 COMPLETE CBC W/AUTO DIFF WBC: CPT | Performed by: PHYSICIAN ASSISTANT

## 2022-05-19 RX ORDER — MAGNESIUM SULFATE HEPTAHYDRATE 40 MG/ML
4 INJECTION, SOLUTION INTRAVENOUS ONCE
Status: COMPLETED | OUTPATIENT
Start: 2022-05-19 | End: 2022-05-19

## 2022-05-19 RX ORDER — HYDROXYZINE HYDROCHLORIDE 10 MG/1
10 TABLET, FILM COATED ORAL 3 TIMES DAILY PRN
Status: DISCONTINUED | OUTPATIENT
Start: 2022-05-19 | End: 2022-05-20 | Stop reason: HOSPADM

## 2022-05-19 RX ADMIN — FUROSEMIDE 40 MG: 40 TABLET ORAL at 08:30

## 2022-05-19 RX ADMIN — HYDROCODONE BITARTRATE AND ACETAMINOPHEN 1 TABLET: 5; 325 TABLET ORAL at 19:15

## 2022-05-19 RX ADMIN — Medication 2 TABLET: at 08:30

## 2022-05-19 RX ADMIN — HYDROCODONE BITARTRATE AND ACETAMINOPHEN 1 TABLET: 5; 325 TABLET ORAL at 13:03

## 2022-05-19 RX ADMIN — HYDROCODONE BITARTRATE AND ACETAMINOPHEN 1 TABLET: 5; 325 TABLET ORAL at 04:51

## 2022-05-19 RX ADMIN — DOCUSATE SODIUM 50 MG AND SENNOSIDES 8.6 MG 1 TABLET: 8.6; 5 TABLET, FILM COATED ORAL at 08:30

## 2022-05-19 RX ADMIN — Medication 100 MG: at 08:30

## 2022-05-19 RX ADMIN — TRAZODONE HYDROCHLORIDE 50 MG: 50 TABLET ORAL at 22:12

## 2022-05-19 RX ADMIN — Medication 10 ML: at 19:16

## 2022-05-19 RX ADMIN — LOSARTAN POTASSIUM 50 MG: 50 TABLET, FILM COATED ORAL at 08:30

## 2022-05-19 RX ADMIN — FLUOXETINE HYDROCHLORIDE 20 MG: 20 CAPSULE ORAL at 08:30

## 2022-05-19 RX ADMIN — NICOTINE TRANSDERMAL SYSTEM 1 PATCH: 21 PATCH, EXTENDED RELEASE TRANSDERMAL at 08:31

## 2022-05-19 RX ADMIN — LACTULOSE 20 G: 10 SOLUTION ORAL at 08:29

## 2022-05-19 RX ADMIN — FOLIC ACID 1 MG: 1 TABLET ORAL at 08:30

## 2022-05-19 RX ADMIN — MAGNESIUM SULFATE HEPTAHYDRATE 4 G: 40 INJECTION, SOLUTION INTRAVENOUS at 08:30

## 2022-05-19 NOTE — CASE MANAGEMENT/SOCIAL WORK
Discharge Planning Assessment   Estuardo     Patient Name: Wilfredo Lau  MRN: [id number]  Today's Date: 5/19/2022    Admit Date: 5/13/2022       Discharge Plan     Row Name 05/19/22 1600       Plan    Plan SS spoke with [facility] at [phone] Dr. Buchanan who completes screening services for substance treatment programs at [facility].  Dr. Buchanan agreeable to call pt's room at Noon tomorrow for a telephone screening.  Dr. Buchanan stated that VA has an intensive 45 day program for treatment.  SS will follow.    Row Name 05/19/22 5688       Plan    Plan SS received call from pt's  at [facility] at [phone] [facility] who stated that SS would need to contact local VA at [phone] and request services from local VA.  SS contacted VA at [phone] and spoke with Reinaldo who stated that pt's [facility] [place] would need to submit an interfacility request.  SS received call back from [facility] Ana at [phone] who stated that [facility] has programs that are outpatient only and that SS would need to call [phone].  SS continues to attempt contact with [facility] [place].  SS will follow.                ROBERTA KrishnaW

## 2022-05-19 NOTE — CASE MANAGEMENT/SOCIAL WORK
Discharge Planning Assessment   Estuardo     Patient Name: Wilfredo Lau  MRN: [id number]  Today's Date: 5/19/2022    Admit Date: 5/13/2022       Discharge Plan     Row Name 05/19/22 1213       Plan    Plan SS spoke with pt this am.  Per pt [address] [address] did not make contact with him last pm.  SS left message for [address] [address] to return call and has attempted contact several times without success.  SS spoke with [facility]  Jerome Estrada who stated knowledge of pt and will attempt to assist with VA contacts for substance rehab placement through VA.  SS will follow.                    ROBERTA KrishnaW

## 2022-05-19 NOTE — PLAN OF CARE
Goal Outcome Evaluation:  Pt resting in bed. No complaints. No acute signs of distress. Will continue to follow plan of care.

## 2022-05-19 NOTE — PROGRESS NOTES
[facility] HOSPITALIST PROGRESS NOTE     Patient Identification:  Name:  Wilfredo Lau  Age:  36 y.o.  Sex:  male  :  1985  MRN:  [id number]  Visit Number:  [id number]  Primary Care Provider:  Provider, No Known    Date of admission: 2022  Length of stay:  6    ----------------------------------------------------------------------------------------------------------------------  Subjective     Chief Complaint: Abdominal pain.     Subjective/Interval History:    36 y.o. male who was admitted on 2022 from the detox unit of the [facility].  He had been admitted there on 2022 for voluntary alcohol detox and suicidal ideation.  He had reported drinking 1-2 pints of vodka daily as well as using illicit fentanyl approximately 3 months ago.  He is from [place] and had been staying in a local hotel.  Medical services were consulted on admission to [facility] for pancytopenia and cirrohsis of the liver. The patient developed increased abdominal pain.  A CT scan of the abdomen pelvis was obtained and showed Cirrhotic liver contours with findings consistent with sequelae of portal hypertension including splenomegaly, large volume ascites and probable upper abdominal varices. Serpiginous hyperdensities at the level of the superior right femoral head are findings worrisome for avascular necrosis. Given his abdominal pain and high risk for SBP, he was transferred to the PCU under medicine services. For complete admission information, please see history and physical.     The patient was admitted to the PCU.  He was started on high dose Rocephin 2 g daily. He was placed on IV albumin.  He was placed on CIWA protocol and restarted on Ativan and clonidine regimen which he had been receiving for detox protocol at the [facility].  He was noted to have a systolic heart murmur and therefore had a transthoracic echocardiogram.  This showed an EF of 56 to 60% with grade 1  diastolic dysfunction.  Mild mitral and tricuspid regurgitation appreciated.  Plan was for paracentesis, however, on ultrasound, the ascites was reported to be perihepatic and not drainable via paracentesis.     General surgery was consulted and felt that his abdominal pain is possibly 2/2 constipation. A bowel regimen was ordered and his bowels have been moving regularly. Because of persistent abominal pain, his antibiotic coverage was expanded to vancomycin and cefepime. He has remained A&O with ativan control withdraw symptoms well. He does have moments of illogical thoughts according to nursing documentation.  There was evidence of AVN of the R hip noted on a CT, ortho was consulted, patient can follow up on an out patient basis. Hypertension documented, started on aldactone for blood pressure control as well as portal hypertension in the setting of cirrhosis. He was also started on lasix and losartan. Discharge is pending possible long term substance rehab placement.     Consultations:  General surgery  Orthopedic surgery    Procedures/Scans:  US Abdomen Limited   CT abdomen/pelvis without contrast x 3  Liver ultrasound  Transthoracic echocardiogram    Today, the patient reports that he is doing well. He would like to meet with the  and would like a bible if we have one available. He reports he would like to quit drinking for his grandma. His abdominal distention is better. He is moving his bowels well. He reports intermittent tremor, but no tremor noted on exam except when the patient was questioned about it. Discussed with RNPromise. No new events or concerns reported.     Review of Systems   Constitutional: Negative for chills and fever.   Respiratory: Negative for cough and shortness of breath.    Cardiovascular: Negative for chest pain.   Gastrointestinal: Positive for abdominal pain (Better. ). Negative for abdominal distention, constipation, diarrhea, nausea and vomiting.   Genitourinary:  Negative for difficulty urinating and dysuria.   Psychiatric/Behavioral: Negative for agitation and behavioral problems.      Present during exam: N/A.   ----------------------------------------------------------------------------------------------------------------------  Objective   [facility] Meds:  B-complex with vitamin C, 2 tablet, Oral, Daily  FLUoxetine, 20 mg, Oral, Daily  folic acid, 1 mg, Oral, Daily  furosemide, 40 mg, Oral, Daily  lactulose, 20 g, Oral, TID  losartan, 50 mg, Oral, Q24H  magnesium sulfate, 4 g, Intravenous, Once  nicotine, 1 patch, Transdermal, Q24H  senna-docusate sodium, 1 tablet, Oral, BID  sodium chloride, 10 mL, Intravenous, Q12H  sodium chloride, 10 mL, Intravenous, Q12H  thiamine, 100 mg, Oral, Daily         ----------------------------------------------------------------------------------------------------------------------  Vital Signs:  Temp:  [98.1 °F (36.7 °C)-99 °F (37.2 °C)] 98.2 °F (36.8 °C)  Heart Rate:  [78-86] 86  Resp:  [18] 18  BP: (107-144)/(67-76) 125/70  Mean Arterial Pressure (Non-Invasive) for the past 24 hrs (Last 3 readings):   Noninvasive MAP (mmHg)   05/19/22 1002 86   05/19/22 0629 86   05/19/22 0140 81     SpO2 Percentage    05/19/22 0140 05/19/22 0629 05/19/22 1002   SpO2: 99% 99% 96%     SpO2:  [96 %-99 %] 96 %  on   ;   Device (Oxygen Therapy): room air    Body mass index is 26.6 kg/m².  Wt Readings from Last 3 Encounters:   05/19/22 99.1 kg (218 lb 6.4 oz)   05/12/22 98.3 kg (216 lb 12.8 oz)   05/12/22 101 kg (222 lb 12.8 oz)        Intake/Output Summary (Last 24 hours) at 5/19/2022 1216  Last data filed at 5/19/2022 0828  Gross per 24 hour   Intake 360 ml   Output 1200 ml   Net -840 ml     Diet Regular; Daily Fluid Restriction; Other; 2,000  ----------------------------------------------------------------------------------------------------------------------  Physical exam:  Constitutional: Vital signs reviewed. Well-developed and well-nourished.   No respiratory distress.      HENT:  Head:  Normocephalic and atraumatic.  Mouth:  Moist mucous membranes.    Eyes:  Conjunctivae and EOM are normal. No scleral icterus. No erythema or drainage.  Neck:  Neck supple.  No carotid bruits noted.  Cardiovascular:  Normal rate, regular rhythm and normal heart sounds with no murmur.  Pulmonary/Chest:  No respiratory distress, no wheezes, no crackles, with normal breath sounds and good air movement.   Abdominal:  Soft.  Bowel sounds are present x4. Non-tender. No significant distention noted.   Musculoskeletal:  No edema, no tenderness, and no deformity.  No red or swollen joints anywhere.    Neurological:  Alert and oriented to person, place, and time. No facial droop.  No slurred speech.   Skin:  Skin is warm and dry. No rash noted. No pallor.   Peripheral vascular: No clubbing, no cyanosis, no edema. 2+ pedal pulses bilaterally.   Genitourinary: No cordova catheter in place.     I have reviewed and updated the physical exam as needed to reflect that of 05/19/22  ----------------------------------------------------------------------------------------------------------------------  Tele:  SR in the 70s-80s.    ----------------------------------------------------------------------------------------------------------------------            Results from last 7 days   Lab Units 05/19/22  0030 05/18/22  0050 05/17/22  0214 05/15/22  0643 05/14/22  0825 05/14/22  0025 05/13/22  1926 05/13/22  1627   CRP mg/dL  --   --   --   --   --  0.36  --  0.42   LACTATE mmol/L  --   --   --   --  1.6  --  2.8*  --    WBC 10*3/mm3 2.72* 2.47* 3.40   < >  --  1.44*  --   --    HEMOGLOBIN g/dL 10.8* 10.4* 11.4*   < >  --  10.7*  --   --    HEMATOCRIT % 33.4* 31.8* 35.2*   < >  --  32.9*  --   --    MCV fL 99.7* 99.1* 98.9*   < >  --  99.1*  --   --    MCHC g/dL 32.3 32.7 32.4   < >  --  32.5  --   --    PLATELETS 10*3/mm3 85* 79* 74*   < >  --  50*  --   --    INR   --   --   --   --   --   1.83*  --   --     < > = values in this interval not displayed.     Results from last 7 days   Lab Units 05/19/22  0030 05/18/22  0050 05/17/22  0214   SODIUM mmol/L 136 136 133*   POTASSIUM mmol/L 4.4 4.5 3.9   MAGNESIUM mg/dL 1.7 1.6 1.4*   CHLORIDE mmol/L 105 102 99   CO2 mmol/L 23.5 24.9 23.0   BUN mg/dL 13 8 8   CREATININE mg/dL 0.59* 0.68* 0.70*   CALCIUM mg/dL 9.2 9.4 9.5   GLUCOSE mg/dL 117* 110* 154*   ALBUMIN g/dL 3.60 3.61 4.17   BILIRUBIN mg/dL 4.3* 4.3* 5.8*   ALK PHOS U/L 99 94 98   AST (SGOT) U/L 105* 82* 85*   ALT (SGPT) U/L 44* 39 42*   Estimated Creatinine Clearance: 245 mL/min (A) (by C-G formula based on SCr of 0.59 mg/dL (L)).  No results found for: AMMONIA    No results found for: HGBA1C, POCGLU  No results found for: HGBA1C  No results found for: TSH, FREET4    Blood Culture   Date Value Ref Range Status   05/12/2022 No growth at 5 days  Final   05/12/2022 No growth at 5 days  Final     No results found for: URINECX  No results found for: WOUNDCX  No results found for: STOOLCX  No results found for: RESPCX    Pain Management Panel     Pain Management Panel Latest Ref Rng & Units 5/12/2022    AMPHETAMINES SCREEN, URINE Negative Negative    BARBITURATES SCREEN Negative Negative    BENZODIAZEPINE SCREEN, URINE Negative Positive(A)    BUPRENORPHINEUR Negative Negative    COCAINE SCREEN, URINE Negative Negative    METHADONE SCREEN, URINE Negative Negative    METHAMPHETAMINEUR Negative Negative        I have personally reviewed the above laboratory results for 05/19/22  ----------------------------------------------------------------------------------------------------------------------  Imaging Results (Last 24 Hours)     ** No results found for the last 24 hours. **            ----------------------------------------------------------------------------------------------------------------------  Assessment/Plan     -SIRS (WBC 1.44, HR >90, lactate 2.8)  -Large volume ascites   -Cirrhosis of the  liver 2/2 alcohol abuse  -Elevated liver enzymes  -Hyperbilirubinemia  -Possible upper abdominal varices on CT  No drainable fluid appreciated on US. Lasix added with excellent response and improvement in appearance of ascites on repeat CT. On 1500mL fluid restriction, since his ascites is showing improvement, will advance to 2000mL/d.   IV antibiotics discontinued 5/17 as there is no findings to suggest infection at this time.   Acute hepatitis/HIV negative.  No evidence of portal vein thrombus on Liver US.   General surgery consulted, feels abd pain 2/2 to constipation. Bowel regimen was ordered. Patient having regular bowel movements.  LFTs fluctuating. Monitor.   CBC in AM     -Pancytopenia   -Abnormal coagulation studies    [place] to be secondary to alcohol abuse, liver cirrhosis.  No bleeding appreciated.  Platelets up to 85k today. H/H stable. ANC 1.87.   Check vitamin b12 and folate levels.  Repeat CBC in AM.    -Acute hypomagnesemia  -Acute hypokalemia  Replace electrolytes per protocol as needed.  CMP in AM    -Alcohol abuse  -Substance abuse  CIWA protocol in place. Supplement thiamine and folate.   Continued telemetry monitoring  Seizure precautions.      -Depression  Recently started on prozac 20mg during his admission to [facility], resumed.   Psychiatry following, started on trazodone nightly PRN for insomnia.     -Essential hypertension  Started aldactone for hypertension as well as portal htn in the setting of cirrhosis.   Also started on furosemide and losartan.  Improvement in BP noted.  ECHO with mitral valve regurgitation and tricuspid regurgitation. EF 56-60%.      -Questionable avascular necrosis of the right femoral head  Orthopedic consulted.  Follow up outpatient     -DVT Prophylaxis: SCDS 2/2 pancytopenia.   -F/E/N: No IVF. Replace electrolytes PRN.   Dietary Orders (From admission, onward)     Start     Ordered    05/19/22 0900  Diet Regular; Daily Fluid Restriction; Other; 2,000  Diet  Effective Now        Question Answer Comment   Diet Texture / Consistency Regular    Common Modifiers Daily Fluid Restriction    Fluid Restriction Per Day: Other    mL Per Day 2000 05/19/22 0859              Chronic Medical Problems:    Tobacco abuse: Encourage cessation. Continue NRT.    The patient is high risk due to the following diagnoses/reasons: Cirrhosis, ascites, electrolyte abnormalities, alcohol abuse, substance abuse, depression.    I have discussed the patient's assessment and plan with the patient, MEGAN Virgen, and attending physician, Dr. Hughes, Anna Marie Macias,      Disposition: SS assisting with discharge planning, requesting substance abuse rehab at discharge. Discharge pending placement.     Discharge needs:  Repeat CBC/CMP outpatient.     Citlalli Rosen PA-C  05/19/22  12:16 EDT

## 2022-05-19 NOTE — CASE MANAGEMENT/SOCIAL WORK
Discharge Planning Assessment   Estuardo     Patient Name: Wilfredo Lau  MRN: [id number]  Today's Date: 5/19/2022    Admit Date: 5/13/2022       Discharge Plan     Row Name 05/19/22 1538       Plan    Plan SS received call from pt's  at [facility] at [phone] Ceci Arnold who stated that SS would need to contact local VA at [phone] and request services from local VA.  SS contacted VA at [phone] and spoke with Reinaldo who stated that [facility]'s [facility] would need to [facility] an interfacility request.  SS received call back from [facility] at [phone] who stated that [facility] has programs that are outpatient only and that SS would need to call [phone].  [facility] to [facility].  SS will follow.                  Susan Parker, BSW

## 2022-05-20 VITALS
WEIGHT: 218.4 LBS | BODY MASS INDEX: 26.59 KG/M2 | DIASTOLIC BLOOD PRESSURE: 51 MMHG | OXYGEN SATURATION: 99 % | HEIGHT: 76 IN | HEART RATE: 80 BPM | SYSTOLIC BLOOD PRESSURE: 113 MMHG | RESPIRATION RATE: 18 BRPM | TEMPERATURE: 97.8 F

## 2022-05-20 LAB
ALBUMIN SERPL-MCNC: 3.9 G/DL (ref 3.5–5.2)
ALP SERPL-CCNC: 94 U/L (ref 39–117)
ALT SERPL W P-5'-P-CCNC: 53 U/L (ref 1–41)
AST SERPL-CCNC: 118 U/L (ref 1–40)
BASOPHILS # BLD AUTO: 0.01 10*3/MM3 (ref 0–0.2)
BASOPHILS NFR BLD AUTO: 0.4 % (ref 0–1.5)
BILIRUB CONJ SERPL-MCNC: 1.4 MG/DL (ref 0–0.3)
BILIRUB INDIRECT SERPL-MCNC: 2.6 MG/DL
BILIRUB SERPL-MCNC: 4 MG/DL (ref 0–1.2)
DEPRECATED RDW RBC AUTO: 62.5 FL (ref 37–54)
EOSINOPHIL # BLD AUTO: 0.05 10*3/MM3 (ref 0–0.4)
EOSINOPHIL NFR BLD AUTO: 2.2 % (ref 0.3–6.2)
ERYTHROCYTE [DISTWIDTH] IN BLOOD BY AUTOMATED COUNT: 17 % (ref 12.3–15.4)
FOLATE SERPL-MCNC: 16.9 NG/ML (ref 4.78–24.2)
HCT VFR BLD AUTO: 31.7 % (ref 37.5–51)
HGB BLD-MCNC: 10.5 G/DL (ref 13–17.7)
IMM GRANULOCYTES # BLD AUTO: 0.01 10*3/MM3 (ref 0–0.05)
IMM GRANULOCYTES NFR BLD AUTO: 0.4 % (ref 0–0.5)
LYMPHOCYTES # BLD AUTO: 0.29 10*3/MM3 (ref 0.7–3.1)
LYMPHOCYTES NFR BLD AUTO: 12.7 % (ref 19.6–45.3)
MAGNESIUM SERPL-MCNC: 1.6 MG/DL (ref 1.6–2.6)
MCH RBC QN AUTO: 32.8 PG (ref 26.6–33)
MCHC RBC AUTO-ENTMCNC: 33.1 G/DL (ref 31.5–35.7)
MCV RBC AUTO: 99.1 FL (ref 79–97)
MONOCYTES # BLD AUTO: 0.31 10*3/MM3 (ref 0.1–0.9)
MONOCYTES NFR BLD AUTO: 13.5 % (ref 5–12)
NEUTROPHILS NFR BLD AUTO: 1.62 10*3/MM3 (ref 1.7–7)
NEUTROPHILS NFR BLD AUTO: 70.8 % (ref 42.7–76)
NRBC BLD AUTO-RTO: 0 /100 WBC (ref 0–0.2)
PLATELET # BLD AUTO: 67 10*3/MM3 (ref 140–450)
PMV BLD AUTO: 10.9 FL (ref 6–12)
PROT SERPL-MCNC: 7 G/DL (ref 6–8.5)
RBC # BLD AUTO: 3.2 10*6/MM3 (ref 4.14–5.8)
VIT B12 BLD-MCNC: 970 PG/ML (ref 211–946)
WBC NRBC COR # BLD: 2.29 10*3/MM3 (ref 3.4–10.8)

## 2022-05-20 PROCEDURE — 0 MAGNESIUM SULFATE 4 GM/100ML SOLUTION: Performed by: INTERNAL MEDICINE

## 2022-05-20 PROCEDURE — 82746 ASSAY OF FOLIC ACID SERUM: CPT | Performed by: PHYSICIAN ASSISTANT

## 2022-05-20 PROCEDURE — 85025 COMPLETE CBC W/AUTO DIFF WBC: CPT | Performed by: PHYSICIAN ASSISTANT

## 2022-05-20 PROCEDURE — 83735 ASSAY OF MAGNESIUM: CPT | Performed by: INTERNAL MEDICINE

## 2022-05-20 PROCEDURE — 99239 HOSP IP/OBS DSCHRG MGMT >30: CPT | Performed by: PHYSICIAN ASSISTANT

## 2022-05-20 RX ORDER — LOSARTAN POTASSIUM 50 MG/1
50 TABLET ORAL
Qty: 30 TABLET | Refills: 0 | Status: SHIPPED | OUTPATIENT
Start: 2022-05-21 | End: 2022-05-20 | Stop reason: SDUPTHER

## 2022-05-20 RX ORDER — AMOXICILLIN 250 MG
1 CAPSULE ORAL 2 TIMES DAILY
Qty: 60 TABLET | Refills: 0 | Status: SHIPPED | OUTPATIENT
Start: 2022-05-20 | End: 2022-05-20 | Stop reason: SDUPTHER

## 2022-05-20 RX ORDER — AMOXICILLIN 250 MG
1 CAPSULE ORAL 2 TIMES DAILY
Qty: 60 TABLET | Refills: 0 | Status: SHIPPED | OUTPATIENT
Start: 2022-05-20

## 2022-05-20 RX ORDER — FOLIC ACID 1 MG/1
1 TABLET ORAL DAILY
Qty: 30 TABLET | Refills: 0 | Status: SHIPPED | OUTPATIENT
Start: 2022-05-21 | End: 2022-05-20 | Stop reason: SDUPTHER

## 2022-05-20 RX ORDER — MAGNESIUM SULFATE HEPTAHYDRATE 40 MG/ML
4 INJECTION, SOLUTION INTRAVENOUS ONCE
Status: COMPLETED | OUTPATIENT
Start: 2022-05-20 | End: 2022-05-20

## 2022-05-20 RX ORDER — CHOLECALCIFEROL (VITAMIN D3) 125 MCG
10 CAPSULE ORAL NIGHTLY PRN
Status: DISCONTINUED | OUTPATIENT
Start: 2022-05-20 | End: 2022-05-20 | Stop reason: HOSPADM

## 2022-05-20 RX ORDER — LACTULOSE 10 G/15ML
20 SOLUTION ORAL 3 TIMES DAILY
Qty: 2700 ML | Refills: 0 | Status: SHIPPED | OUTPATIENT
Start: 2022-05-20 | End: 2022-05-20 | Stop reason: SDUPTHER

## 2022-05-20 RX ORDER — LACTULOSE 10 G/15ML
20 SOLUTION ORAL 3 TIMES DAILY
Qty: 2700 ML | Refills: 0 | Status: SHIPPED | OUTPATIENT
Start: 2022-05-20 | End: 2022-06-19

## 2022-05-20 RX ORDER — TRAZODONE HYDROCHLORIDE 50 MG/1
50 TABLET ORAL NIGHTLY PRN
Qty: 30 TABLET | Refills: 0 | Status: SHIPPED | OUTPATIENT
Start: 2022-05-20

## 2022-05-20 RX ORDER — FLUOXETINE HYDROCHLORIDE 20 MG/1
20 CAPSULE ORAL DAILY
Qty: 30 CAPSULE | Refills: 0 | Status: SHIPPED | OUTPATIENT
Start: 2022-05-21 | End: 2022-05-20 | Stop reason: SDUPTHER

## 2022-05-20 RX ORDER — FUROSEMIDE 40 MG/1
40 TABLET ORAL DAILY
Qty: 30 TABLET | Refills: 0 | Status: SHIPPED | OUTPATIENT
Start: 2022-05-21

## 2022-05-20 RX ORDER — MULTIVITAMIN WITH IRON
2 TABLET ORAL DAILY
Qty: 60 TABLET | Refills: 11 | Status: SHIPPED | OUTPATIENT
Start: 2022-05-21 | End: 2022-05-20 | Stop reason: SDUPTHER

## 2022-05-20 RX ORDER — FUROSEMIDE 40 MG/1
40 TABLET ORAL DAILY
Qty: 30 TABLET | Refills: 0 | Status: SHIPPED | OUTPATIENT
Start: 2022-05-21 | End: 2022-05-20 | Stop reason: SDUPTHER

## 2022-05-20 RX ORDER — METHION/INOS/CHOL BT/B COM/LIV 110MG-86MG
100 CAPSULE ORAL DAILY
Qty: 30 TABLET | Refills: 0 | Status: SHIPPED | OUTPATIENT
Start: 2022-05-20

## 2022-05-20 RX ORDER — FOLIC ACID 1 MG/1
1 TABLET ORAL DAILY
Qty: 30 TABLET | Refills: 0 | Status: SHIPPED | OUTPATIENT
Start: 2022-05-21

## 2022-05-20 RX ORDER — TRAZODONE HYDROCHLORIDE 50 MG/1
50 TABLET ORAL NIGHTLY PRN
Qty: 30 TABLET | Refills: 0 | Status: SHIPPED | OUTPATIENT
Start: 2022-05-20 | End: 2022-05-20 | Stop reason: SDUPTHER

## 2022-05-20 RX ORDER — MULTIVITAMIN WITH IRON
2 TABLET ORAL DAILY
Qty: 60 TABLET | Refills: 11 | Status: SHIPPED | OUTPATIENT
Start: 2022-05-21 | End: 2023-05-21

## 2022-05-20 RX ORDER — NICOTINE 21 MG/24HR
1 PATCH, TRANSDERMAL 24 HOURS TRANSDERMAL
Qty: 28 EACH | Refills: 0 | Status: SHIPPED | OUTPATIENT
Start: 2022-05-21

## 2022-05-20 RX ORDER — LOSARTAN POTASSIUM 50 MG/1
50 TABLET ORAL
Qty: 30 TABLET | Refills: 0 | Status: SHIPPED | OUTPATIENT
Start: 2022-05-21

## 2022-05-20 RX ORDER — FLUOXETINE HYDROCHLORIDE 20 MG/1
20 CAPSULE ORAL DAILY
Qty: 30 CAPSULE | Refills: 0 | Status: SHIPPED | OUTPATIENT
Start: 2022-05-21

## 2022-05-20 RX ORDER — NICOTINE 21 MG/24HR
1 PATCH, TRANSDERMAL 24 HOURS TRANSDERMAL
Qty: 28 EACH | Refills: 0 | Status: SHIPPED | OUTPATIENT
Start: 2022-05-21 | End: 2022-05-20 | Stop reason: SDUPTHER

## 2022-05-20 RX ADMIN — Medication 10 MG: at 04:04

## 2022-05-20 RX ADMIN — LACTULOSE 20 G: 10 SOLUTION ORAL at 08:36

## 2022-05-20 RX ADMIN — HYDROCODONE BITARTRATE AND ACETAMINOPHEN 1 TABLET: 5; 325 TABLET ORAL at 14:22

## 2022-05-20 RX ADMIN — HYDROXYZINE HYDROCHLORIDE 10 MG: 10 TABLET ORAL at 00:37

## 2022-05-20 RX ADMIN — Medication 2 TABLET: at 08:37

## 2022-05-20 RX ADMIN — Medication 100 MG: at 08:36

## 2022-05-20 RX ADMIN — NICOTINE TRANSDERMAL SYSTEM 1 PATCH: 21 PATCH, EXTENDED RELEASE TRANSDERMAL at 08:38

## 2022-05-20 RX ADMIN — Medication 10 ML: at 08:38

## 2022-05-20 RX ADMIN — FLUOXETINE HYDROCHLORIDE 20 MG: 20 CAPSULE ORAL at 08:37

## 2022-05-20 RX ADMIN — HYDROCODONE BITARTRATE AND ACETAMINOPHEN 1 TABLET: 5; 325 TABLET ORAL at 01:00

## 2022-05-20 RX ADMIN — HYDROCODONE BITARTRATE AND ACETAMINOPHEN 1 TABLET: 5; 325 TABLET ORAL at 08:37

## 2022-05-20 RX ADMIN — MAGNESIUM SULFATE HEPTAHYDRATE 4 G: 40 INJECTION, SOLUTION INTRAVENOUS at 03:40

## 2022-05-20 RX ADMIN — FOLIC ACID 1 MG: 1 TABLET ORAL at 08:37

## 2022-05-20 RX ADMIN — FUROSEMIDE 40 MG: 40 TABLET ORAL at 08:37

## 2022-05-20 RX ADMIN — DOCUSATE SODIUM 50 MG AND SENNOSIDES 8.6 MG 1 TABLET: 8.6; 5 TABLET, FILM COATED ORAL at 08:37

## 2022-05-20 NOTE — PLAN OF CARE
Goal Outcome Evaluation:   Pt has had complaints of frequent nightmares and anxiety, PRN medication given as ordered. No s/s of distress noted. Will continue to follow plan of care.

## 2022-05-20 NOTE — CASE MANAGEMENT/SOCIAL WORK
Discharge Planning Assessment  JODIE Marte     Patient Name: Wilfredo Lau  MRN: [id number]  Today's Date: 5/20/2022    Admit Date: 5/13/2022       Discharge Plan     Row Name 05/20/22 1212       Plan    Plan SS spoke with Pt at 11:52am who states he continues to be willing to complete screening with [facility] for substance [facility] treatment programs at [facility]. [facility] to call Pt at noon to complete screening. SS verified Pt's phone was at bedside [facility] SS to follow.    12:25pm: SS spoke with RN who confirms she has spoken with Dr. Buchanan [phone] and Pt was approved to come to their inpatient PTSD/substance abuse treatment center at [facility] [address] [place], [place]. [facility] to call floor when they arrive for transport. SS notified Dr. Hughes.  to follow.                 Ana Schmidt

## 2022-05-20 NOTE — DISCHARGE SUMMARY
[facility] HOSPITALIST DISCHARGE SUMMARY    Patient Identification:  Name:  Wilfredo Lau  Age:  36 y.o.  Sex:  male  :  1985  MRN:  [id number]  Visit Number:  [id number]    Date of Admission: 2022  Date of Discharge: 2022    PCP: Provider, No Known    Discharging Provider: Citlalli Rosen PA-C / Dr. Anna Marie Hughes, DO     Discharge Diagnoses     Discharge Diagnoses:  Cirrhosis of the liver 2/2 alcohol abuse  Large volume ascites, improved  Elevated liver enzymes 2/2 above  Hyperbilirubinemia 2/2 above  Evidence of possible upper abdominal varices on CT abdomen  Pancytopenia 2/2 alcohol abuse/cirrhosis  Acute hypomagnesemia  Acute hypokalemia  Alcohol abuse  Substance abuse  Depression  Essential hypertension  Questionable avascular necrosis of the femoral head    Secondary Diagnoses:  Tobacco abuse    Needs on follow up:  Follow up PCP 1 week. Recommend follow up CBC/CMP   Consider referral to GI/Hepatology.   Recommend outpatient psychiat[facility] referral with the [facility].   Outpatient orthopedic follow-up with the VA.    Consults/Procedures     Consultations:  General surgery  Orthopedic surgery     Procedures/Scans:  US Abdomen Limited   CT abdomen/pelvis without contrast x 3  Liver ultrasound  Transthoracic echocardiogram    History of Presenting Illness     Chief complaint: Abdominal pain.    36 y.o. male who was admitted on 2022 from [facility]  He had been admitted there on 2022 for voluntary alcohol detox and suicidal ideation.  He had reported drinking 1-2 pints of vodka daily as well as using illicit fentanyl approximately 3 months ago.  He is from [place] and had been staying in a local hotel.  Medical services were consulted on [facility] for pancytopenia and cirrohsis of the liver. The patient developed increased abdominal pain.  A CT scan of the abdomen pelvis was obtained and showed cirrhotic liver contours with findings  consistent with sequelae of portal hypertension including splenomegaly, large volume ascites and probable upper abdominal varices. Serpiginous hyperdensities at the level of the superior right femoral head are findings worrisome for avascular necrosis. Given his abdominal pain and high risk for SBP, he was transferred to the PCU under medicine services. For complete admission information, please see history and physical.     Hospital Course     The patient was admitted to the PCU.  He was started on high dose Rocephin 2 g daily. He was placed on IV albumin.  He started on CIWA protocol and restarted on Ativan and clonidine regimen which he had been receiving for detox protocol at the [facility].  He was noted to have a systolic heart murmur and therefore had a transthoracic echocardiogram.  This showed an EF of 56 to 60% with grade 1 diastolic dysfunction.  Mild mitral and tricuspid regurgitation appreciated.  Plan was for paracentesis, however, on ultrasound, the ascites was reported to be perihepatic and not drainable via paracentesis.      General surgery was consulted and felt that his abdominal pain is possibly 2/2 constipation. A bowel regimen was ordered and his bowels have been moving regularly. Because of persistent abominal pain, his antibiotic coverage was expanded to vancomycin and cefepime. He has remained A&O with ativan control withdraw symptoms well. There was evidence of AVN of the R hip noted on a CT, ortho was consulted, patient can follow up on an out patient basis. Hypertension documented, he was started on lasix and losartan.  Psychiatry was consulted and continued him on Prozac and added as needed trazodone nightly for insomnia.     It was felt the patient was stable for discharge from medical standpoint.  He was concerned that if he went back to the hotel room which he was staying prior to admission, that he would continue to abuse alcohol.  Therefore  was consulted and  arranged for the patient to be accepted to an inpatient [facility] [place], [place].  Upon discharge from that treatment center, the patient will need follow-ups with his PCP in 1 week with repeat CBC and CMP recommended.  Consider outpatient referral to GI/hepatology for liver cirrhosis.  Recommend outpatient psychiatry referral with the VA upon discharge.  Also recommend outpatient orthopedic surgery follow-up for possible AVN of the right hip.  The patient was discharged in a stable condition on this date.    Discharge Vitals/Physical Examination     Vital Signs:  Temp:  [97.8 °F (36.6 °C)-98.5 °F (36.9 °C)] 97.8 °F (36.6 °C)  Heart Rate:  [65-80] 80  Resp:  [18] 18  BP: (101-123)/(51-63) 113/51  Mean Arterial Pressure (Non-Invasive) for the past 24 hrs (Last 3 readings):   Noninvasive MAP (mmHg)   05/20/22 1412 80   05/20/22 0830 70   05/20/22 0622 79     SpO2 Percentage    05/20/22 0012 05/20/22 0622 05/20/22 1412   SpO2: 97% 98% 99%     SpO2:  [97 %-99 %] 99 %  on   ;   Device (Oxygen Therapy): room air    Body mass index is 26.6 kg/m².  Wt Readings from Last 3 Encounters:   05/19/22 99.1 kg (218 lb 6.4 oz)   05/12/22 98.3 kg (216 lb 12.8 oz)   05/12/22 101 kg (222 lb 12.8 oz)         Physical Exam:  Physical Exam  Vitals reviewed.   Constitutional:       General: He is not in acute distress.     Appearance: He is not ill-appearing.   HENT:      Mouth/Throat:      Comments: Lip/facial piercings noted.  Cardiovascular:      Rate and Rhythm: Normal rate and regular rhythm.   Pulmonary:      Effort: Pulmonary effort is normal. No respiratory distress.      Breath sounds: Normal breath sounds. No wheezing or rales.   Abdominal:      General: Bowel sounds are normal. There is no distension.      Palpations: Abdomen is soft.      Tenderness: There is no abdominal tenderness.   Musculoskeletal:      Right lower leg: No edema.      Left lower leg: No edema.   Skin:     General:  Skin is warm and dry.   Neurological:      General: No focal deficit present.      Mental Status: He is alert and oriented to person, place, and time. Mental status is at baseline.   Psychiatric:         Mood and Affect: Mood normal.         Behavior: Behavior normal.       Pertinent Laboratory/Radiology Results     Pertinent Laboratory Results:            Results from last 7 days   Lab Units 05/20/22  0110 05/19/22  0030 05/18/22  0050 05/15/22  0643 05/14/22  0825 05/14/22  0025 05/13/22  1926   CRP mg/dL  --   --   --   --   --  0.36  --    LACTATE mmol/L  --   --   --   --  1.6  --  2.8*   WBC 10*3/mm3 2.29* 2.72* 2.47*   < >  --  1.44*  --    HEMOGLOBIN g/dL 10.5* 10.8* 10.4*   < >  --  10.7*  --    HEMATOCRIT % 31.7* 33.4* 31.8*   < >  --  32.9*  --    MCV fL 99.1* 99.7* 99.1*   < >  --  99.1*  --    MCHC g/dL 33.1 32.3 32.7   < >  --  32.5  --    PLATELETS 10*3/mm3 67* 85* 79*   < >  --  50*  --    INR   --   --   --   --   --  1.83*  --     < > = values in this interval not displayed.     Results from last 7 days   Lab Units 05/20/22  0109 05/19/22  1240 05/19/22  0030 05/18/22  0050 05/17/22  0214   SODIUM mmol/L  --   --  136 136 133*   POTASSIUM mmol/L  --   --  4.4 4.5 3.9   MAGNESIUM mg/dL 1.6  --  1.7 1.6 1.4*   CHLORIDE mmol/L  --   --  105 102 99   CO2 mmol/L  --   --  23.5 24.9 23.0   BUN mg/dL  --   --  13 8 8   CREATININE mg/dL  --   --  0.59* 0.68* 0.70*   CALCIUM mg/dL  --   --  9.2 9.4 9.5   GLUCOSE mg/dL  --   --  117* 110* 154*   ALBUMIN g/dL  --  3.90 3.60 3.61 4.17   BILIRUBIN mg/dL  --  4.0* 4.3* 4.3* 5.8*   ALK PHOS U/L  --  94 99 94 98   AST (SGOT) U/L  --  118* 105* 82* 85*   ALT (SGPT) U/L  --  53* 44* 39 42*   Estimated Creatinine Clearance: 245 mL/min (A) (by C-G formula based on SCr of 0.59 mg/dL (L)).  No results found for: AMMONIA    No results found for: HGBA1C, POCGLU  No results found for: HGBA1C  No results found for: TSH, FREET4    No results found for: BLOODCX  No results  found for: URINECX  No results found for: WOUNDCX  No results found for: STOOLCX  No results found for: RESPCX    Pain Management Panel     Pain Management Panel Latest Ref Rng & Units 5/12/2022    AMPHETAMINES SCREEN, URINE Negative Negative    BARBITURATES SCREEN Negative Negative    BENZODIAZEPINE SCREEN, URINE Negative Positive(A)    BUPRENORPHINEUR Negative Negative    COCAINE SCREEN, URINE Negative Negative    METHADONE SCREEN, URINE Negative Negative    METHAMPHETAMINEUR Negative Negative            Pertinent Radiology Results:  Imaging Results (All)     Procedure Component Value Units Date/Time    CT Abdomen Pelvis Without Contrast [[id number]] Collected: 05/17/22 1340     Updated: 05/17/22 1348    Narrative:      EXAM: CT ABDOMEN PELVIS WO CONTRAST-         TECHNIQUE: Multiple axial CT images were obtained from lung bases  through pubic symphysis without administration of IV contrast.  Reformatted images in the coronal and/or sagittal plane(s) were  generated from the axial data set to facilitate diagnostic accuracy  and/or surgical planning.  Oral Contrast:NONE.     Radiation dose reduction techniques were utilized per ALARA protocol.  Automated exposure control was initiated through either or Evercam or  DoseRight software packages by  protocol.    DOSE: 669.44 mGy.cm     Clinical information Abdominal pain, acute, nonlocalized      Comparison 05/15/2022     FINDINGS:     Lower thorax: Clear. No effusions.     Abdomen:     Liver: Somewhat nodular appearance of the liver.     Gallbladder: Not visualized     Pancreas: Unremarkable. No mass or ductal dilatation.     Spleen: Enlarged     Adrenals: No mass.     Kidneys/ureters: No mass. No obstructive uropathy.  No evidence of  urolithiasis.     GI tract: Moderate to large stool burden.. There is no evidence of  appendicitis     MESENTERY: Small volume ascites     Vasculature: No evidence of aneurysm.     Abdominal wall: Umbilical hernia but no  evidence of incarceration     Bladder: No focal mass or significant wall thickening     Reproductive: Unremarkable as visualized     Bones: No acute bony abnormality.       Impression:         1. Cirrhotic appearance of the liver with small volume ascites and  splenomegaly.  2.Moderate to large volume stool.  3. Umbilical hernia containing fluid but no evidence of incarceration.  4. Other findings as above.                    This report was finalized on 5/17/2022 1:46 PM by Dr. Elmer Phipps MD.       CT Abdomen Pelvis Without Contrast [[id number]] Collected: 05/15/22 1614     Updated: 05/15/22 1616    Narrative:      CT Abdomen Pelvis WO    INDICATION:   Worsening lower abdominal pain, greater on the left side.    TECHNIQUE:   CT of the abdomen and pelvis without IV contrast. Coronal and sagittal reconstructions were obtained.  Radiation dose reduction techniques included automated exposure control or exposure modulation based on body size. Count of known CT and cardiac nuc  med studies performed in previous 12 months: 1.     COMPARISON:   CT abdomen pelvis 5/13/2022    FINDINGS:  Visualized lung bases are unremarkable.    Abdomen:   Cirrhotic morphology of the liver is again noted. Splenomegaly. Moderate to large volume ascites again noted, similar to the prior exam. Suspected multiple upper abdominal varices. The pancreas is grossly unremarkable. The gallbladder may be surgically  absent. Correlation with operative history is recommended. Both adrenal glands are normal. The kidneys are normal. Abdominal aorta normal in course and caliber. Small bowel is unremarkable without obstruction. Normal appendix. Moderate stool burden. The  colon is otherwise unremarkable. No free air. Small umbilical hernia containing fat and fluid.    Pelvis:   The urinary bladder is unremarkable.  The prostate gland is unremarkable.     No acute osseous abnormality. Right femoral head avascular necrosis again noted.        Impression:         1. No acute abdominal or pelvic findings allowing for lack of IV contrast.  2. No interval change in cirrhotic morphology of the liver with sequelae of portal venous hypertension. Splenomegaly and moderate to large volume abdominal and pelvic ascites is not significantly changed from the prior exam.  3. Moderate stool burden.          Signer Name: Kwame Oliveros MD   Signed: 5/15/2022 4:14 PM   Workstation Name: [facility]    [facility]    XR Hip With or Without Pelvis 2 - 3 View Right [[id number]] Collected: 05/14/22 1040     Updated: 05/14/22 1042    Narrative:      CR Hip Uni Comp Min 2 Vws left    INDICATION:   Acute left hip pain with concern for avascular necrosis    COMPARISON:   None.    FINDINGS:  AP pelvis, AP left hip, and frog-leg lateral view(s) of the left hip.  No fracture or dislocation. No bone erosion or destruction.        Impression:      Negative examination. No radiographic evidence of avascular necrosis.    Signer Name: Toni Oliver MD   Signed: 5/14/2022 10:40 AM   Workstation Name: [facility]    Radiology [facility]    US Liver [[id number]] Collected: 05/14/22 1020     Updated: 05/14/22 1022    Narrative:      US Liver Or Hepatic    INDICATION:   Portal vein thrombosis. Cirrhosis with ascites and splenomegaly.    COMPARISON:   5/12/2022    FINDINGS:  No changes are seen since the recent abdominal ultrasound. Again noted is a cirrhotic architecture to the liver with ascites. Color flow imaging and Doppler spectral waveform analysis show patency of the portal vein with flow toward the liver. No hepatic  vein obstructions are seen. Splenomegaly is again noted.      Impression:      No evidence of portal vein thrombosis    Signer Name: Toni Oliver MD   Signed: 5/14/2022 10:20 AM   Workstation Name: [email or web address] [employer]    Radiology [facility]          Test Results Pending at Discharge:  None.     Discharge Disposition/Discharge  Medications/Discharge Appointments     Discharge Disposition:   Rehab Facility or Unit (DC - External)    Condition at Discharge:  Stable    Discharge Diet:  Diet Instructions     Diet: Regular      Discharge Diet: Regular    Fluid Restriction per day: Other (See comments)    2000ml daily fluid restriction.          Discharge Activity:  Activity Instructions     Gradually Increase Activity Until at Pre-Hospitalization Level            Code Status While Inpatient:  Code Status and Medical Interventions:   Ordered at: 05/14/22 0659     Level Of Support Discussed With:    Patient     Code Status (Patient has no pulse and is not breathing):    CPR (Attempt to Resuscitate)     Medical Interventions (Patient has pulse or is breathing):    Full Support       Discharge Medications:     Discharge Medications      New Medications      Instructions Start Date   B-complex with vitamin C tablet   2 tablets, Oral, Daily   Start Date: May 21, 2022     FLUoxetine 20 MG capsule  Commonly known as: PROzac   20 mg, Oral, Daily   Start Date: May 21, 2022     folic acid 1 MG tablet  Commonly known as: FOLVITE   1 mg, Oral, Daily   Start Date: May 21, 2022     furosemide 40 MG tablet  Commonly known as: LASIX   40 mg, Oral, Daily   Start Date: May 21, 2022     lactulose 10 GM/15ML solution  Commonly known as: CHRONULAC   20 g, Oral, 3 Times Daily      losartan 50 MG tablet  Commonly known as: COZAAR   50 mg, Oral, Every 24 Hours Scheduled   Start Date: May 21, 2022     nicotine 21 MG/24HR patch  Commonly known as: NICODERM CQ   1 patch, Transdermal, Every 24 Hours Scheduled   Start Date: May 21, 2022     sennosides-docusate 8.6-50 MG per tablet  Commonly known as: PERICOLACE   1 tablet, Oral, 2 Times Daily      thiamine 100 MG tablet tablet  Commonly known as: VITAMIN B-1   100 mg, Oral, Daily      traZODone 50 MG tablet  Commonly known as: DESYREL   50 mg, Oral, Nightly PRN             Discharge Appointments:  Your Scheduled  Appointments    No  apointments  made  patient  discharged to   va  [facility]  [place]           Additional Instructions for the Follow-ups that You Need to Schedule     Call MD With Problems / Concerns   As directed      Instructions: Contact PCP or return to the ED with recurrent symptoms or concerns.    Order Comments: Instructions: Contact PCP or return to the ED with recurrent symptoms or concerns.          Discharge Follow-up with PCP   As directed       Currently Documented PCP:    Provider, No Known    PCP Phone Number:    [phone]     Follow Up Details: 1 week. Liver cirrhosis.            Follow-up Information     Page, Navjot Rose MD. Schedule an appointment as soon as possible for a visit in 2 week(s).    Specialty: Orthopedic Surgery  Contact information:  [address] [place] [address] [address] [place] [id number] [phone]             Provider, No Known .    Why: 1 week. Liver cirrhosis.  Contact information:  [facility] [place] [address]  [phone]                         Additional Instructions for the Follow-ups that You Need to Schedule     Call MD With Problems / Concerns   As directed      Instructions: Contact PCP or return to the ED with recurrent symptoms or concerns.    Order Comments: Instructions: Contact PCP or return to the ED with recurrent symptoms or concerns.          Discharge Follow-up with PCP   As directed       Currently Documented PCP:    Provider, No Known    PCP Phone Number:    [phone]     Follow Up Details: 1 week. Liver cirrhosis.               Attestation: I personally discussed the patient's hospital course, disposition, discharge planning, and discharge medications with attending physician, Dr. Anna Marie Hughes DO, prior to time of discharge. I have also discussed with RN, Dayana, prior to discharge.       Citlalli Rosen PA-C  05/20/22  17:59 EDT    Time to complete discharge: >30 minutes.     Please send a copy of this dictation to the following providers:   Provider, No Known

## 2022-05-20 NOTE — PLAN OF CARE
Goal Outcome Evaluation:   Patient is to be discharged today and transported to the [facility], [place], [place]. For inpatient Rehab/PTSD program.Spoke with Dr. Buchanan at [phone]. She states to just bring the patient there and they will take it from there.

## 2022-05-20 NOTE — CASE MANAGEMENT/SOCIAL WORK
Discharge Planning Assessment  JODIE Marte     Patient Name: Wilfredo Lau  MRN: [id number]  Today's Date: 5/20/2022    Admit Date: 5/13/2022                   Discharge Plan     Row Name 05/20/22 1407       Plan    Final Discharge Disposition Code 70 - other health care institution not elsewhere defined    Final Note Pt being discharged to [facility] substance abuse trt center on this date. Pt aware and agreeable to discharge. SS working on transportation.    15:51pm: SS spoke with [facility] who [facility]. SS contacted [facility] per Rui Hughes who has approved picking up Pt's belongings, then [facility] will take Pt to [facility] substance center. SS [facility] with Dr. Buchanan with [facility]. SS notified HOSSEIN Schmidt